# Patient Record
Sex: FEMALE | Race: WHITE | Employment: PART TIME | URBAN - METROPOLITAN AREA
[De-identification: names, ages, dates, MRNs, and addresses within clinical notes are randomized per-mention and may not be internally consistent; named-entity substitution may affect disease eponyms.]

---

## 2019-09-14 ENCOUNTER — APPOINTMENT (OUTPATIENT)
Dept: CT IMAGING | Facility: HOSPITAL | Age: 69
End: 2019-09-14
Attending: INTERNAL MEDICINE
Payer: MEDICARE

## 2019-09-14 ENCOUNTER — APPOINTMENT (OUTPATIENT)
Dept: CT IMAGING | Facility: HOSPITAL | Age: 69
End: 2019-09-14
Attending: EMERGENCY MEDICINE
Payer: MEDICARE

## 2019-09-14 ENCOUNTER — APPOINTMENT (OUTPATIENT)
Dept: GENERAL RADIOLOGY | Facility: HOSPITAL | Age: 69
End: 2019-09-14
Attending: EMERGENCY MEDICINE
Payer: MEDICARE

## 2019-09-14 ENCOUNTER — HOSPITAL ENCOUNTER (OUTPATIENT)
Facility: HOSPITAL | Age: 69
Setting detail: OBSERVATION
Discharge: HOME | End: 2019-09-15
Attending: EMERGENCY MEDICINE | Admitting: HOSPITALIST
Payer: MEDICARE

## 2019-09-14 DIAGNOSIS — E87.6 HYPOKALEMIA: ICD-10-CM

## 2019-09-14 DIAGNOSIS — R55 SYNCOPE AND COLLAPSE: Primary | ICD-10-CM

## 2019-09-14 PROBLEM — R73.9 HYPERGLYCEMIA: Status: ACTIVE | Noted: 2019-09-14

## 2019-09-14 PROCEDURE — 71275 CT ANGIOGRAPHY CHEST: CPT | Performed by: INTERNAL MEDICINE

## 2019-09-14 PROCEDURE — 99220 INITIAL OBSERVATION CARE,LEVL III: CPT | Performed by: INTERNAL MEDICINE

## 2019-09-14 PROCEDURE — 70450 CT HEAD/BRAIN W/O DYE: CPT | Performed by: EMERGENCY MEDICINE

## 2019-09-14 PROCEDURE — 71045 X-RAY EXAM CHEST 1 VIEW: CPT | Performed by: EMERGENCY MEDICINE

## 2019-09-14 RX ORDER — ONDANSETRON 2 MG/ML
4 INJECTION INTRAMUSCULAR; INTRAVENOUS EVERY 4 HOURS PRN
Status: DISCONTINUED | OUTPATIENT
Start: 2019-09-14 | End: 2019-09-15

## 2019-09-14 RX ORDER — CHOLECALCIFEROL (VITAMIN D3) 50 MCG
CAPSULE ORAL
COMMUNITY

## 2019-09-14 RX ORDER — ACETAMINOPHEN 325 MG/1
650 TABLET ORAL EVERY 6 HOURS PRN
Status: DISCONTINUED | OUTPATIENT
Start: 2019-09-14 | End: 2019-09-15

## 2019-09-14 RX ORDER — POTASSIUM CHLORIDE 20 MEQ/1
40 TABLET, EXTENDED RELEASE ORAL ONCE
Status: COMPLETED | OUTPATIENT
Start: 2019-09-14 | End: 2019-09-14

## 2019-09-14 RX ORDER — METOCLOPRAMIDE HYDROCHLORIDE 5 MG/ML
INJECTION INTRAMUSCULAR; INTRAVENOUS
Status: DISPENSED
Start: 2019-09-14 | End: 2019-09-15

## 2019-09-14 RX ORDER — ENOXAPARIN SODIUM 100 MG/ML
40 INJECTION SUBCUTANEOUS DAILY
Status: DISCONTINUED | OUTPATIENT
Start: 2019-09-14 | End: 2019-09-15

## 2019-09-14 RX ORDER — SODIUM CHLORIDE 9 MG/ML
INJECTION, SOLUTION INTRAVENOUS CONTINUOUS
Status: ACTIVE | OUTPATIENT
Start: 2019-09-14 | End: 2019-09-14

## 2019-09-14 RX ORDER — METOCLOPRAMIDE HYDROCHLORIDE 5 MG/ML
10 INJECTION INTRAMUSCULAR; INTRAVENOUS ONCE
Status: COMPLETED | OUTPATIENT
Start: 2019-09-14 | End: 2019-09-14

## 2019-09-14 RX ORDER — DIPHENHYDRAMINE HYDROCHLORIDE 50 MG/ML
INJECTION INTRAMUSCULAR; INTRAVENOUS
Status: DISPENSED
Start: 2019-09-14 | End: 2019-09-15

## 2019-09-14 RX ORDER — DIPHENHYDRAMINE HYDROCHLORIDE 50 MG/ML
25 INJECTION INTRAMUSCULAR; INTRAVENOUS ONCE
Status: COMPLETED | OUTPATIENT
Start: 2019-09-14 | End: 2019-09-14

## 2019-09-14 RX ORDER — SODIUM CHLORIDE 9 MG/ML
INJECTION, SOLUTION INTRAVENOUS CONTINUOUS
Status: DISCONTINUED | OUTPATIENT
Start: 2019-09-14 | End: 2019-09-15

## 2019-09-14 NOTE — ED PROVIDER NOTES
Patient Seen in: [facility] Emergency Department    History   Patient presents with:  Syncope (cardiovascular, neurologic)    Stated Complaint:     HPI    This is a 78-year-old female who arrives here with complaints of a syncopal episode at [facility]. retraction. No crackles. CV: Cardiovascular is regular without murmurs or rubs. ABD: The abdomen is soft nondistended nontender. There is no rebound. There is no guarding. EXT: There is good pulses bilaterally. There is no calf tenderness.   Karson Her she was given IV fluids. I went back and reexamined her she is sleepy but easily arousable. She still has a little bit of nausea but significant better. Some of her sleepiness may be from the Benadryl that she had gotten in the [facility].   The creatinine i Dr. Radha Cm. The patient still felt somewhat nauseousness. Therefore she would want to hold off on the potassium was not significantly decreased with probably can replace her with IV potassium with her at regular IV fluids.   I discussed this case with

## 2019-09-14 NOTE — ED INITIAL ASSESSMENT (HPI)
Here per EMS after she became syncopal at [facility]. + n/v, denies chest pain.

## 2019-09-15 ENCOUNTER — APPOINTMENT (OUTPATIENT)
Dept: CV DIAGNOSTICS | Facility: HOSPITAL | Age: 69
End: 2019-09-15
Attending: INTERNAL MEDICINE
Payer: MEDICARE

## 2019-09-15 ENCOUNTER — APPOINTMENT (OUTPATIENT)
Dept: ULTRASOUND IMAGING | Facility: HOSPITAL | Age: 69
End: 2019-09-15
Attending: INTERNAL MEDICINE
Payer: MEDICARE

## 2019-09-15 VITALS
SYSTOLIC BLOOD PRESSURE: 132 MMHG | HEIGHT: 62 IN | DIASTOLIC BLOOD PRESSURE: 76 MMHG | OXYGEN SATURATION: 100 % | HEART RATE: 78 BPM | TEMPERATURE: 98 F | BODY MASS INDEX: 24.59 KG/M2 | RESPIRATION RATE: 18 BRPM | WEIGHT: 133.63 LBS

## 2019-09-15 PROCEDURE — 93306 TTE W/DOPPLER COMPLETE: CPT | Performed by: INTERNAL MEDICINE

## 2019-09-15 PROCEDURE — 99225 SUBSEQUENT OBSERVATION CARE: CPT | Performed by: HOSPITALIST

## 2019-09-15 PROCEDURE — 93970 EXTREMITY STUDY: CPT | Performed by: INTERNAL MEDICINE

## 2019-09-15 PROCEDURE — 99203 OFFICE O/P NEW LOW 30 MIN: CPT | Performed by: INTERNAL MEDICINE

## 2019-09-15 NOTE — PLAN OF CARE
Assumed care of patient around 0730. Pt a/o x 4, RA, SR on tele monitor in the 76s. Denies chest pain/discomfort or SOB. Denies dizziness while ambulating. Orthos negative this a.m. Plan for cards to see, BLE US. ECHO completed this a.m., awaiting results. administer replacement therapy as ordered  Outcome: Progressing     Problem: METABOLIC/FLUID AND ELECTROLYTES - ADULT  Goal: Electrolytes maintained within normal limits  Description  INTERVENTIONS:  - Monitor labs and rhythm and assess patient for signs a Progressing     Problem: MUSCULOSKELETAL - ADULT  Goal: Return mobility to safest level of function  Description  INTERVENTIONS:  - Assess patient stability and activity tolerance for standing, transferring and ambulating w/ or w/o assistive devices  - Ass

## 2019-09-15 NOTE — H&P
[facility]OSPITAL[facility]  History and Physical     Atul Aguilar Patient Status:  Emergency    1950 MRN [id number]   Location [address] [facility] Attending Donovan Silvestre MD   Hosp Day # 0 PCP Carolynn Walters MD     Chief Complaint: bruits. Respiratory: Clear to auscultation bilaterally. No wheezes. No rhonchi. Cardiovascular: S1, S2. Regular rate and rhythm. No murmurs, rubs or gallops. Equal pulses. Chest and Back: No tenderness or deformity.   Abdomen: Soft, nontender, nondisten

## 2019-09-15 NOTE — PROGRESS NOTES
09/15/19 0934 09/15/19 0938 09/15/19 0940   Vital Signs   Respiratory Quality Normal Normal Normal   BP (!) 153/130 131/64 133/78   BP Location Left arm Left arm Left arm   BP Method Automatic Automatic Automatic   Patient Position Lying Sitting Standin

## 2019-09-15 NOTE — PLAN OF CARE
ED admit for syncope while at [facility]. Patient states she was reading bible, dropped it, bent over to get it and lost consciousness. She denies any dizziness or palpitations prior to the event.  Patient states this has happened a \"few times\" in the past, o

## 2019-09-15 NOTE — DISCHARGE SUMMARY
[facility] [place] HOSPITALIST  DISCHARGE SUMMARY     Atul Aguilar Patient Status:  Observation    1950 MRN [id number]   [facility] 8NE-A Attending Pablito Lobo MD   Hosp Day # 0 PCP Carolynn Walters MD     Date of Admission: 2019  Date of Marnie [place] Respiratory: Clear to auscultation bilaterally. No wheezes. No rhonchi. Cardiovascular: S1, S2. Regular rate and rhythm. No murmurs, rubs or gallops. Abdomen: Soft, nontender, nondistended. Positive bowel sounds. No rebound or guarding.   Neurologic:

## 2019-09-15 NOTE — PLAN OF CARE
ED admit for syncope while at [facility]. +nausea and vomiting on admit. Patient is alert/oriented x4. PERRLA. CMS intact. O2 sats > 94% on RA. Lungs CTA. D-dimer elevated, CTA chest negative for PE. Awaiting (B) LE US. NSR on tele. VSS.  Continues on IVF 0.9@ quality of pulses, skin color and temperature  - Assess for signs of decreased coronary artery perfusion - ex.  Angina  - Evaluate fluid balance, assess for edema, trend weights  Outcome: Progressing  Goal: Absence of cardiac arrhythmias or at baseline  Max products/factors, fluids and medications as ordered and appropriate  - Administer supportive blood products/factors as ordered and appropriate  Outcome: Progressing  Goal: Free from bleeding injury  Description  (Example usage: patient with low platelets)

## 2019-09-15 NOTE — PROGRESS NOTES
[facility]PITAL[facility]  Progress Note     Bre Rapp Patient Status:  Observation    1950 MRN [id number]   [facility] 8NE-A Attending Cleopatra Melo MD   Hosp Day # 0 PCP Radha Taylor MD     Chief Complaint: syncope    S: Patient denie need to consider holter   2. Hypokalemia - replace, mg and K in am   3. Mild acute kidney inury  1.  IVF     Quality:  · DVT Prophylaxis: lovenox  · CODE status: full  · Gomez: none     Plan of care discussed with pt and ER.      Tyler Xavier MD

## 2019-09-15 NOTE — PROGRESS NOTES
09/15/19 1446   Clinical Encounter Type   Visited With Patient   Routine Visit Introduction  ( initiated POA discussion.  patient states that this has been completed and will prvide hospital with a copy at earliest opportunity)   [facility] Encoun

## 2019-09-15 NOTE — CONSULTS
[facility]  Electrophysiology Initial Consult Note      Sheila Stein Patient Status:  Observation    1950 MRN [id number]   [facility] 8NE-A Attending Montserrat Crawford MD   Hosp Day # 0 PCP Tim Hooper MD     [facility] °F (36.7 °C), temperature source Oral, resp. rate 18, height 5' 2\" (1.575 m), weight 133 lb 9.6 oz (60.6 kg), SpO2 97 %.   Temp (24hrs), Av °F (36.7 °C), Min:97.5 °F (36.4 °C), Max:98.3 °F (36.8 °C)    Wt Readings from Last 3 Encounters:  14/ : 13

## 2019-09-15 NOTE — PLAN OF CARE
Problem: SAFETY ADULT - FALL  Goal: Free from fall injury  Description  INTERVENTIONS:  - Assess pt frequently for physical needs  - Identify cognitive and physical deficits and behaviors that affect risk of falls.   - [place] fall precautions as indica Progressing     Problem: METABOLIC/FLUID AND ELECTROLYTES - ADULT  Goal: Electrolytes maintained within normal limits  Description  INTERVENTIONS:  - Monitor labs and rhythm and assess patient for signs and symptoms of electrolyte imbalances  - Administer rectal medication administration  - Ensure safe mobilization of patient  - Hold pressure on venipuncture sites to achieve adequate hemostasis  - Assess for signs and symptoms of internal bleeding  - Monitor lab trends  9/15/2019 9648 by Anya Monroe RN

## 2019-09-15 NOTE — PLAN OF CARE
NURSING DISCHARGE NOTE    Discharged Home via Wheelchair. Accompanied by Family member and Support staff  Belongings Taken by patient/family. IV removed, catheter intact. D/c instructions given. No new medications. F/u appts discussed.    All

## 2019-09-15 NOTE — PROGRESS NOTES
09/14/19 2138 09/14/19 2139 09/14/19 2140   Vital Signs   /79 147/82 130/84   BP Location Left arm Left arm Left arm   BP Method Automatic Automatic Automatic   Patient Position Lying Sitting Standing

## 2023-06-05 PROBLEM — M76.32 ILIOTIBIAL BAND SYNDROME, LEFT LEG: Status: ACTIVE | Noted: 2021-06-15

## 2023-06-05 PROBLEM — M70.62 TROCHANTERIC BURSITIS OF LEFT HIP: Status: ACTIVE | Noted: 2021-06-15

## 2023-06-05 RX ORDER — GABAPENTIN 100 MG/1
100 CAPSULE ORAL NIGHTLY
COMMUNITY
Start: 2023-05-23

## 2023-06-05 RX ORDER — LORATADINE 10 MG/1
10 TABLET ORAL AS DIRECTED
COMMUNITY

## 2023-06-05 RX ORDER — TRIAMTERENE AND HYDROCHLOROTHIAZIDE 37.5; 25 MG/1; MG/1
1 CAPSULE ORAL EVERY MORNING
COMMUNITY
Start: 2022-06-11 | End: 2023-06-05

## 2023-06-05 RX ORDER — TIZANIDINE 2 MG/1
1 TABLET ORAL EVERY 6 HOURS PRN
COMMUNITY
Start: 2023-06-02

## 2023-06-05 RX ORDER — LOSARTAN POTASSIUM 25 MG/1
25 TABLET ORAL DAILY
COMMUNITY
Start: 2022-08-03 | End: 2023-06-05

## 2023-06-05 NOTE — PAT NURSING NOTE
Abnormal Ekg reviewed by /anesthesia. He requests note of medical clearance pre op. Faxed request to and spoke with Prema Springer @ pcp office. She will give message to MD. Also faxed request to surgeon as [phone] [address].

## 2023-06-06 ENCOUNTER — HOSPITAL ENCOUNTER (EMERGENCY)
Age: 73
Discharge: HOME | End: 2023-06-06
Attending: STUDENT IN AN ORGANIZED HEALTH CARE EDUCATION/TRAINING PROGRAM
Payer: MEDICARE

## 2023-06-06 VITALS
OXYGEN SATURATION: 99 % | WEIGHT: 140 LBS | TEMPERATURE: 98 F | BODY MASS INDEX: 25.76 KG/M2 | HEIGHT: 62 IN | HEART RATE: 72 BPM | RESPIRATION RATE: 18 BRPM | DIASTOLIC BLOOD PRESSURE: 84 MMHG | SYSTOLIC BLOOD PRESSURE: 144 MMHG

## 2023-06-06 DIAGNOSIS — I10 HYPERTENSION, UNSPECIFIED TYPE: Primary | ICD-10-CM

## 2023-06-06 LAB
ANION GAP SERPL CALC-SCNC: 5 MMOL/L (ref 0–18)
ATRIAL RATE: 72 BPM
BUN BLD-MCNC: 25 MG/DL (ref 7–18)
CALCIUM BLD-MCNC: 9.3 MG/DL (ref 8.5–10.1)
CHLORIDE SERPL-SCNC: 106 MMOL/L (ref 98–112)
CO2 SERPL-SCNC: 26 MMOL/L (ref 21–32)
CREAT BLD-MCNC: 1.03 MG/DL
GFR SERPLBLD BASED ON 1.73 SQ M-ARVRAT: 57 ML/MIN/1.73M2 (ref 60–?)
GLUCOSE BLD-MCNC: 88 MG/DL (ref 70–99)
OSMOLALITY SERPL CALC.SUM OF ELEC: 288 MOSM/KG (ref 275–295)
P AXIS: 68 DEGREES
P-R INTERVAL: 156 MS
POTASSIUM SERPL-SCNC: 4 MMOL/L (ref 3.5–5.1)
Q-T INTERVAL: 424 MS
QRS DURATION: 76 MS
QTC CALCULATION (BEZET): 464 MS
R AXIS: 21 DEGREES
SODIUM SERPL-SCNC: 137 MMOL/L (ref 136–145)
T AXIS: 52 DEGREES
VENTRICULAR RATE: 72 BPM

## 2023-06-06 PROCEDURE — 99284 EMERGENCY DEPT VISIT MOD MDM: CPT

## 2023-06-06 PROCEDURE — 99283 EMERGENCY DEPT VISIT LOW MDM: CPT

## 2023-06-06 PROCEDURE — 93010 ELECTROCARDIOGRAM REPORT: CPT

## 2023-06-06 PROCEDURE — 80048 BASIC METABOLIC PNL TOTAL CA: CPT | Performed by: STUDENT IN AN ORGANIZED HEALTH CARE EDUCATION/TRAINING PROGRAM

## 2023-06-06 PROCEDURE — 93005 ELECTROCARDIOGRAM TRACING: CPT

## 2023-06-06 PROCEDURE — 36415 COLL VENOUS BLD VENIPUNCTURE: CPT

## 2023-06-06 RX ORDER — HYDROCHLOROTHIAZIDE 12.5 MG/1
12.5 TABLET ORAL DAILY
Qty: 30 TABLET | Refills: 0 | Status: SHIPPED | OUTPATIENT
Start: 2023-06-06

## 2023-06-06 NOTE — ED INITIAL ASSESSMENT (HPI)
Pt to ed with c/o HTN at dr office; 190/80, normal readings at home this AM.  Denies headaches, n/v, CP or visual disturbances   No hypertensive hx

## 2023-06-07 ENCOUNTER — EKG ENCOUNTER (OUTPATIENT)
Dept: LAB | Age: 73
End: 2023-06-07
Attending: ORTHOPAEDIC SURGERY
Payer: MEDICARE

## 2023-06-07 ENCOUNTER — HOSPITAL ENCOUNTER (OUTPATIENT)
Dept: GENERAL RADIOLOGY | Age: 73
Discharge: HOME | End: 2023-06-07
Attending: ORTHOPAEDIC SURGERY
Payer: MEDICARE

## 2023-06-07 ENCOUNTER — LABORATORY ENCOUNTER (OUTPATIENT)
Dept: LAB | Age: 73
End: 2023-06-07
Attending: ORTHOPAEDIC SURGERY
Payer: MEDICARE

## 2023-06-07 DIAGNOSIS — Z01.818 PRE-OP TESTING: ICD-10-CM

## 2023-06-07 LAB
ALBUMIN SERPL-MCNC: 3.9 G/DL (ref 3.4–5)
ALP LIVER SERPL-CCNC: 75 U/L
ALT SERPL-CCNC: 18 U/L
APTT PPP: 28.2 SECONDS (ref 23.3–35.6)
AST SERPL-CCNC: 14 U/L (ref 15–37)
BASOPHILS # BLD AUTO: 0.02 X10(3) UL (ref 0–0.2)
BASOPHILS NFR BLD AUTO: 0.4 %
BILIRUB DIRECT SERPL-MCNC: 0.1 MG/DL (ref 0–0.2)
BILIRUB SERPL-MCNC: 0.4 MG/DL (ref 0.1–2)
BILIRUB UR QL STRIP.AUTO: NEGATIVE
CLARITY UR REFRACT.AUTO: CLEAR
COLOR UR AUTO: YELLOW
EOSINOPHIL # BLD AUTO: 0.06 X10(3) UL (ref 0–0.7)
EOSINOPHIL NFR BLD AUTO: 1.2 %
ERYTHROCYTE [DISTWIDTH] IN BLOOD BY AUTOMATED COUNT: 12.8 %
GLUCOSE UR STRIP.AUTO-MCNC: NEGATIVE MG/DL
HCT VFR BLD AUTO: 38.6 %
HGB BLD-MCNC: 13 G/DL
IMM GRANULOCYTES # BLD AUTO: 0.01 X10(3) UL (ref 0–1)
IMM GRANULOCYTES NFR BLD: 0.2 %
INR BLD: 0.96 (ref 0.85–1.16)
LEUKOCYTE ESTERASE UR QL STRIP.AUTO: NEGATIVE
LYMPHOCYTES # BLD AUTO: 1.03 X10(3) UL (ref 1–4)
LYMPHOCYTES NFR BLD AUTO: 20.1 %
MCH RBC QN AUTO: 30.3 PG (ref 26–34)
MCHC RBC AUTO-ENTMCNC: 33.7 G/DL (ref 31–37)
MCV RBC AUTO: 90 FL
MONOCYTES # BLD AUTO: 0.52 X10(3) UL (ref 0.1–1)
MONOCYTES NFR BLD AUTO: 10.1 %
NEUTROPHILS # BLD AUTO: 3.49 X10 (3) UL (ref 1.5–7.7)
NEUTROPHILS # BLD AUTO: 3.49 X10(3) UL (ref 1.5–7.7)
NEUTROPHILS NFR BLD AUTO: 68 %
NITRITE UR QL STRIP.AUTO: NEGATIVE
PH UR STRIP.AUTO: 6 [PH] (ref 5–8)
PLATELET # BLD AUTO: 257 10(3)UL (ref 150–450)
PROT SERPL-MCNC: 7.7 G/DL (ref 6.4–8.2)
PROT UR STRIP.AUTO-MCNC: NEGATIVE MG/DL
PROTHROMBIN TIME: 12.8 SECONDS (ref 11.6–14.8)
RBC # BLD AUTO: 4.29 X10(6)UL
RBC UR QL AUTO: NEGATIVE
SP GR UR STRIP.AUTO: 1.01 (ref 1–1.03)
UROBILINOGEN UR STRIP.AUTO-MCNC: <2 MG/DL
WBC # BLD AUTO: 5.1 X10(3) UL (ref 4–11)

## 2023-06-07 PROCEDURE — 81001 URINALYSIS AUTO W/SCOPE: CPT

## 2023-06-07 PROCEDURE — 87081 CULTURE SCREEN ONLY: CPT

## 2023-06-07 PROCEDURE — 80076 HEPATIC FUNCTION PANEL: CPT

## 2023-06-07 PROCEDURE — 85610 PROTHROMBIN TIME: CPT

## 2023-06-07 PROCEDURE — 36415 COLL VENOUS BLD VENIPUNCTURE: CPT

## 2023-06-07 PROCEDURE — 85025 COMPLETE CBC W/AUTO DIFF WBC: CPT

## 2023-06-07 PROCEDURE — 71046 X-RAY EXAM CHEST 2 VIEWS: CPT | Performed by: ORTHOPAEDIC SURGERY

## 2023-06-07 PROCEDURE — 85730 THROMBOPLASTIN TIME PARTIAL: CPT

## 2023-06-14 ENCOUNTER — APPOINTMENT (OUTPATIENT)
Dept: GENERAL RADIOLOGY | Facility: HOSPITAL | Age: 73
End: 2023-06-14
Attending: ORTHOPAEDIC SURGERY
Payer: MEDICARE

## 2023-06-14 ENCOUNTER — HOSPITAL ENCOUNTER (INPATIENT)
Facility: HOSPITAL | Age: 73
LOS: 2 days | Discharge: HOME HEALTH | End: 2023-06-16
Attending: ORTHOPAEDIC SURGERY | Admitting: ORTHOPAEDIC SURGERY
Payer: MEDICARE

## 2023-06-14 ENCOUNTER — ANESTHESIA EVENT (OUTPATIENT)
Dept: SURGERY | Facility: HOSPITAL | Age: 73
End: 2023-06-14
Payer: MEDICARE

## 2023-06-14 ENCOUNTER — ANESTHESIA (OUTPATIENT)
Dept: SURGERY | Facility: HOSPITAL | Age: 73
End: 2023-06-14
Payer: MEDICARE

## 2023-06-14 DIAGNOSIS — Z01.818 PRE-OP TESTING: Primary | ICD-10-CM

## 2023-06-14 DIAGNOSIS — E87.6 HYPOKALEMIA: ICD-10-CM

## 2023-06-14 DIAGNOSIS — R55 SYNCOPE AND COLLAPSE: ICD-10-CM

## 2023-06-14 DIAGNOSIS — N17.9 AKI (ACUTE KIDNEY INJURY) (HCC): ICD-10-CM

## 2023-06-14 DIAGNOSIS — R73.9 HYPERGLYCEMIA: ICD-10-CM

## 2023-06-14 DIAGNOSIS — M70.62 TROCHANTERIC BURSITIS OF LEFT HIP: ICD-10-CM

## 2023-06-14 DIAGNOSIS — M76.32 ILIOTIBIAL BAND SYNDROME, LEFT LEG: ICD-10-CM

## 2023-06-14 PROCEDURE — 0SG1071 FUSION OF 2 OR MORE LUMBAR VERTEBRAL JOINTS WITH AUTOLOGOUS TISSUE SUBSTITUTE, POSTERIOR APPROACH, POSTERIOR COLUMN, OPEN APPROACH: ICD-10-PCS | Performed by: ORTHOPAEDIC SURGERY

## 2023-06-14 PROCEDURE — 4A11X4G MONITORING OF PERIPHERAL NERVOUS ELECTRICAL ACTIVITY, INTRAOPERATIVE, EXTERNAL APPROACH: ICD-10-PCS | Performed by: ORTHOPAEDIC SURGERY

## 2023-06-14 PROCEDURE — 76000 FLUOROSCOPY <1 HR PHYS/QHP: CPT | Performed by: ORTHOPAEDIC SURGERY

## 2023-06-14 PROCEDURE — 0SG10AJ FUSION OF 2 OR MORE LUMBAR VERTEBRAL JOINTS WITH INTERBODY FUSION DEVICE, POSTERIOR APPROACH, ANTERIOR COLUMN, OPEN APPROACH: ICD-10-PCS | Performed by: ORTHOPAEDIC SURGERY

## 2023-06-14 PROCEDURE — 0QB00ZZ EXCISION OF LUMBAR VERTEBRA, OPEN APPROACH: ICD-10-PCS | Performed by: ORTHOPAEDIC SURGERY

## 2023-06-14 PROCEDURE — 0ST20ZZ RESECTION OF LUMBAR VERTEBRAL DISC, OPEN APPROACH: ICD-10-PCS | Performed by: ORTHOPAEDIC SURGERY

## 2023-06-14 DEVICE — OSTEOCEL PRO LARGE BULK BUY: Type: IMPLANTABLE DEVICE | Site: BACK | Status: FUNCTIONAL

## 2023-06-14 DEVICE — RELINE LCK SCRW 5.5 OPEN TULIP: Type: IMPLANTABLE DEVICE | Site: BACK | Status: FUNCTIONAL

## 2023-06-14 DEVICE — RELINE MAS MOD SCREW 6.5X45 2C: Type: IMPLANTABLE DEVICE | Site: BACK | Status: FUNCTIONAL

## 2023-06-14 DEVICE — BONE GRAFT KIT 7510200 INFUSE SMALL
Type: IMPLANTABLE DEVICE | Site: BACK | Status: FUNCTIONAL
Brand: INFUSE® BONE GRAFT

## 2023-06-14 DEVICE — RELINE MAS TI ROD 5.5X60 LRDTC: Type: IMPLANTABLE DEVICE | Site: BACK | Status: FUNCTIONAL

## 2023-06-14 DEVICE — ATTRAX® SCAFFOLD STRIPS, SMALL
Type: IMPLANTABLE DEVICE | Site: BACK | Status: FUNCTIONAL
Brand: ATTRAX

## 2023-06-14 DEVICE — COHERE TLIF-O, 12X10X30MM 12°
Type: IMPLANTABLE DEVICE | Site: BACK | Status: FUNCTIONAL
Brand: COHERE

## 2023-06-14 DEVICE — RELINE MAS RED SCREW 6.5X45 2C: Type: IMPLANTABLE DEVICE | Site: BACK | Status: FUNCTIONAL

## 2023-06-14 DEVICE — COHERE TLIF-O, 10X10X30MM 4°
Type: IMPLANTABLE DEVICE | Site: BACK | Status: FUNCTIONAL
Brand: COHERE

## 2023-06-14 DEVICE — RELINE MAS MOD REDUCTION EXT: Type: IMPLANTABLE DEVICE | Site: BACK | Status: FUNCTIONAL

## 2023-06-14 RX ORDER — CELECOXIB 200 MG/1
200 CAPSULE ORAL ONCE
Status: COMPLETED | OUTPATIENT
Start: 2023-06-14 | End: 2023-06-14

## 2023-06-14 RX ORDER — DEXAMETHASONE SODIUM PHOSPHATE 4 MG/ML
VIAL (ML) INJECTION AS NEEDED
Status: DISCONTINUED | OUTPATIENT
Start: 2023-06-14 | End: 2023-06-14 | Stop reason: SURG

## 2023-06-14 RX ORDER — LIDOCAINE HYDROCHLORIDE ANHYDROUS AND DEXTROSE MONOHYDRATE .8; 5 G/100ML; G/100ML
INJECTION, SOLUTION INTRAVENOUS CONTINUOUS PRN
Status: DISCONTINUED | OUTPATIENT
Start: 2023-06-14 | End: 2023-06-14 | Stop reason: SURG

## 2023-06-14 RX ORDER — DIPHENHYDRAMINE HYDROCHLORIDE 50 MG/ML
25 INJECTION INTRAMUSCULAR; INTRAVENOUS EVERY 4 HOURS PRN
Status: DISCONTINUED | OUTPATIENT
Start: 2023-06-14 | End: 2023-06-16

## 2023-06-14 RX ORDER — EPHEDRINE SULFATE 50 MG/ML
INJECTION INTRAVENOUS AS NEEDED
Status: DISCONTINUED | OUTPATIENT
Start: 2023-06-14 | End: 2023-06-14 | Stop reason: SURG

## 2023-06-14 RX ORDER — TRANEXAMIC ACID 10 MG/ML
INJECTION, SOLUTION INTRAVENOUS AS NEEDED
Status: DISCONTINUED | OUTPATIENT
Start: 2023-06-14 | End: 2023-06-14 | Stop reason: SURG

## 2023-06-14 RX ORDER — HYDROMORPHONE HYDROCHLORIDE 1 MG/ML
0.2 INJECTION, SOLUTION INTRAMUSCULAR; INTRAVENOUS; SUBCUTANEOUS EVERY 2 HOUR PRN
Status: DISCONTINUED | OUTPATIENT
Start: 2023-06-14 | End: 2023-06-16

## 2023-06-14 RX ORDER — ONDANSETRON 2 MG/ML
INJECTION INTRAMUSCULAR; INTRAVENOUS AS NEEDED
Status: DISCONTINUED | OUTPATIENT
Start: 2023-06-14 | End: 2023-06-14 | Stop reason: SURG

## 2023-06-14 RX ORDER — PHENYLEPHRINE HCL 10 MG/ML
VIAL (ML) INJECTION AS NEEDED
Status: DISCONTINUED | OUTPATIENT
Start: 2023-06-14 | End: 2023-06-14 | Stop reason: SURG

## 2023-06-14 RX ORDER — SODIUM CHLORIDE, SODIUM LACTATE, POTASSIUM CHLORIDE, CALCIUM CHLORIDE 600; 310; 30; 20 MG/100ML; MG/100ML; MG/100ML; MG/100ML
INJECTION, SOLUTION INTRAVENOUS CONTINUOUS
Status: DISCONTINUED | OUTPATIENT
Start: 2023-06-14 | End: 2023-06-14 | Stop reason: HOSPADM

## 2023-06-14 RX ORDER — OXYCODONE HYDROCHLORIDE 5 MG/1
5 TABLET ORAL ONCE AS NEEDED
Status: DISCONTINUED | OUTPATIENT
Start: 2023-06-14 | End: 2023-06-14 | Stop reason: HOSPADM

## 2023-06-14 RX ORDER — KETAMINE HYDROCHLORIDE 50 MG/ML
INJECTION, SOLUTION, CONCENTRATE INTRAMUSCULAR; INTRAVENOUS AS NEEDED
Status: DISCONTINUED | OUTPATIENT
Start: 2023-06-14 | End: 2023-06-14 | Stop reason: SURG

## 2023-06-14 RX ORDER — ACETAMINOPHEN 10 MG/ML
INJECTION, SOLUTION INTRAVENOUS AS NEEDED
Status: DISCONTINUED | OUTPATIENT
Start: 2023-06-14 | End: 2023-06-14 | Stop reason: SURG

## 2023-06-14 RX ORDER — ENEMA 19; 7 G/133ML; G/133ML
1 ENEMA RECTAL ONCE AS NEEDED
Status: DISCONTINUED | OUTPATIENT
Start: 2023-06-14 | End: 2023-06-16

## 2023-06-14 RX ORDER — ONDANSETRON 2 MG/ML
INJECTION INTRAMUSCULAR; INTRAVENOUS
Status: COMPLETED
Start: 2023-06-14 | End: 2023-06-14

## 2023-06-14 RX ORDER — HYDROMORPHONE HYDROCHLORIDE 1 MG/ML
0.4 INJECTION, SOLUTION INTRAMUSCULAR; INTRAVENOUS; SUBCUTANEOUS EVERY 2 HOUR PRN
Status: DISCONTINUED | OUTPATIENT
Start: 2023-06-14 | End: 2023-06-16

## 2023-06-14 RX ORDER — DIAZEPAM 2 MG/1
2 TABLET ORAL EVERY 6 HOURS PRN
Status: DISCONTINUED | OUTPATIENT
Start: 2023-06-14 | End: 2023-06-16

## 2023-06-14 RX ORDER — SODIUM CHLORIDE, SODIUM LACTATE, POTASSIUM CHLORIDE, CALCIUM CHLORIDE 600; 310; 30; 20 MG/100ML; MG/100ML; MG/100ML; MG/100ML
INJECTION, SOLUTION INTRAVENOUS CONTINUOUS
Status: DISCONTINUED | OUTPATIENT
Start: 2023-06-14 | End: 2023-06-14

## 2023-06-14 RX ORDER — CEFAZOLIN SODIUM/WATER 2 G/20 ML
2 SYRINGE (ML) INTRAVENOUS EVERY 8 HOURS
Status: COMPLETED | OUTPATIENT
Start: 2023-06-14 | End: 2023-06-15

## 2023-06-14 RX ORDER — DOCUSATE SODIUM 100 MG/1
100 CAPSULE, LIQUID FILLED ORAL 2 TIMES DAILY
Status: DISCONTINUED | OUTPATIENT
Start: 2023-06-14 | End: 2023-06-16

## 2023-06-14 RX ORDER — MIDAZOLAM HYDROCHLORIDE 1 MG/ML
1 INJECTION INTRAMUSCULAR; INTRAVENOUS EVERY 5 MIN PRN
Status: DISCONTINUED | OUTPATIENT
Start: 2023-06-14 | End: 2023-06-14 | Stop reason: HOSPADM

## 2023-06-14 RX ORDER — ONDANSETRON 2 MG/ML
4 INJECTION INTRAMUSCULAR; INTRAVENOUS ONCE
Status: COMPLETED | OUTPATIENT
Start: 2023-06-14 | End: 2023-06-14

## 2023-06-14 RX ORDER — OXYCODONE HYDROCHLORIDE 5 MG/1
10 TABLET ORAL ONCE AS NEEDED
Status: DISCONTINUED | OUTPATIENT
Start: 2023-06-14 | End: 2023-06-14 | Stop reason: HOSPADM

## 2023-06-14 RX ORDER — LIDOCAINE HYDROCHLORIDE 10 MG/ML
INJECTION, SOLUTION EPIDURAL; INFILTRATION; INTRACAUDAL; PERINEURAL AS NEEDED
Status: DISCONTINUED | OUTPATIENT
Start: 2023-06-14 | End: 2023-06-14 | Stop reason: SURG

## 2023-06-14 RX ORDER — DIPHENHYDRAMINE HCL 25 MG
25 CAPSULE ORAL EVERY 4 HOURS PRN
Status: DISCONTINUED | OUTPATIENT
Start: 2023-06-14 | End: 2023-06-16

## 2023-06-14 RX ORDER — CEFAZOLIN SODIUM/WATER 2 G/20 ML
2 SYRINGE (ML) INTRAVENOUS ONCE
Status: COMPLETED | OUTPATIENT
Start: 2023-06-14 | End: 2023-06-14

## 2023-06-14 RX ORDER — ACETAMINOPHEN 500 MG
1000 TABLET ORAL ONCE
Status: DISCONTINUED | OUTPATIENT
Start: 2023-06-14 | End: 2023-06-14 | Stop reason: HOSPADM

## 2023-06-14 RX ORDER — HYDROMORPHONE HYDROCHLORIDE 1 MG/ML
0.4 INJECTION, SOLUTION INTRAMUSCULAR; INTRAVENOUS; SUBCUTANEOUS EVERY 5 MIN PRN
Status: DISCONTINUED | OUTPATIENT
Start: 2023-06-14 | End: 2023-06-14 | Stop reason: HOSPADM

## 2023-06-14 RX ORDER — ONDANSETRON 2 MG/ML
4 INJECTION INTRAMUSCULAR; INTRAVENOUS EVERY 6 HOURS PRN
Status: DISCONTINUED | OUTPATIENT
Start: 2023-06-14 | End: 2023-06-14 | Stop reason: HOSPADM

## 2023-06-14 RX ORDER — OXYCODONE HYDROCHLORIDE 5 MG/1
2.5 TABLET ORAL EVERY 4 HOURS PRN
Status: DISCONTINUED | OUTPATIENT
Start: 2023-06-14 | End: 2023-06-15

## 2023-06-14 RX ORDER — METHOCARBAMOL 100 MG/ML
INJECTION, SOLUTION INTRAMUSCULAR; INTRAVENOUS AS NEEDED
Status: DISCONTINUED | OUTPATIENT
Start: 2023-06-14 | End: 2023-06-14 | Stop reason: SURG

## 2023-06-14 RX ORDER — OXYCODONE HYDROCHLORIDE 5 MG/1
5 TABLET ORAL EVERY 4 HOURS PRN
Status: DISCONTINUED | OUTPATIENT
Start: 2023-06-14 | End: 2023-06-15

## 2023-06-14 RX ORDER — ONDANSETRON 2 MG/ML
4 INJECTION INTRAMUSCULAR; INTRAVENOUS EVERY 6 HOURS PRN
Status: DISCONTINUED | OUTPATIENT
Start: 2023-06-14 | End: 2023-06-16

## 2023-06-14 RX ORDER — NALOXONE HYDROCHLORIDE 0.4 MG/ML
80 INJECTION, SOLUTION INTRAMUSCULAR; INTRAVENOUS; SUBCUTANEOUS AS NEEDED
Status: DISCONTINUED | OUTPATIENT
Start: 2023-06-14 | End: 2023-06-14 | Stop reason: HOSPADM

## 2023-06-14 RX ORDER — POLYETHYLENE GLYCOL 3350 17 G/17G
17 POWDER, FOR SOLUTION ORAL DAILY PRN
Status: DISCONTINUED | OUTPATIENT
Start: 2023-06-14 | End: 2023-06-16

## 2023-06-14 RX ORDER — MEPERIDINE HYDROCHLORIDE 25 MG/ML
12.5 INJECTION INTRAMUSCULAR; INTRAVENOUS; SUBCUTANEOUS AS NEEDED
Status: DISCONTINUED | OUTPATIENT
Start: 2023-06-14 | End: 2023-06-14 | Stop reason: HOSPADM

## 2023-06-14 RX ORDER — BISACODYL 10 MG
10 SUPPOSITORY, RECTAL RECTAL
Status: DISCONTINUED | OUTPATIENT
Start: 2023-06-14 | End: 2023-06-16

## 2023-06-14 RX ORDER — SENNOSIDES 8.6 MG
17.2 TABLET ORAL NIGHTLY
Status: DISCONTINUED | OUTPATIENT
Start: 2023-06-14 | End: 2023-06-16

## 2023-06-14 RX ORDER — METOCLOPRAMIDE HYDROCHLORIDE 5 MG/ML
10 INJECTION INTRAMUSCULAR; INTRAVENOUS EVERY 8 HOURS PRN
Status: DISCONTINUED | OUTPATIENT
Start: 2023-06-14 | End: 2023-06-14 | Stop reason: HOSPADM

## 2023-06-14 RX ORDER — METOCLOPRAMIDE HYDROCHLORIDE 5 MG/ML
10 INJECTION INTRAMUSCULAR; INTRAVENOUS EVERY 8 HOURS PRN
Status: DISCONTINUED | OUTPATIENT
Start: 2023-06-14 | End: 2023-06-16

## 2023-06-14 RX ORDER — HYDROMORPHONE HYDROCHLORIDE 1 MG/ML
0.2 INJECTION, SOLUTION INTRAMUSCULAR; INTRAVENOUS; SUBCUTANEOUS EVERY 5 MIN PRN
Status: DISCONTINUED | OUTPATIENT
Start: 2023-06-14 | End: 2023-06-14 | Stop reason: HOSPADM

## 2023-06-14 RX ADMIN — SODIUM CHLORIDE, SODIUM LACTATE, POTASSIUM CHLORIDE, CALCIUM CHLORIDE: 600; 310; 30; 20 INJECTION, SOLUTION INTRAVENOUS at 10:38:00

## 2023-06-14 RX ADMIN — ACETAMINOPHEN 1000 MG: 10 INJECTION, SOLUTION INTRAVENOUS at 12:59:00

## 2023-06-14 RX ADMIN — SODIUM CHLORIDE, SODIUM LACTATE, POTASSIUM CHLORIDE, CALCIUM CHLORIDE: 600; 310; 30; 20 INJECTION, SOLUTION INTRAVENOUS at 13:26:00

## 2023-06-14 RX ADMIN — DEXAMETHASONE SODIUM PHOSPHATE 8 MG: 4 MG/ML VIAL (ML) INJECTION at 11:10:00

## 2023-06-14 RX ADMIN — LIDOCAINE HYDROCHLORIDE ANHYDROUS AND DEXTROSE MONOHYDRATE 2 MG/KG/HR: .8; 5 INJECTION, SOLUTION INTRAVENOUS at 11:00:00

## 2023-06-14 RX ADMIN — EPHEDRINE SULFATE 10 MG: 50 INJECTION INTRAVENOUS at 11:31:00

## 2023-06-14 RX ADMIN — EPHEDRINE SULFATE 5 MG: 50 INJECTION INTRAVENOUS at 11:14:00

## 2023-06-14 RX ADMIN — EPHEDRINE SULFATE 15 MG: 50 INJECTION INTRAVENOUS at 10:53:00

## 2023-06-14 RX ADMIN — LIDOCAINE HYDROCHLORIDE 50 MG: 10 INJECTION, SOLUTION EPIDURAL; INFILTRATION; INTRACAUDAL; PERINEURAL at 10:41:00

## 2023-06-14 RX ADMIN — KETAMINE HYDROCHLORIDE 15 MG: 50 INJECTION, SOLUTION, CONCENTRATE INTRAMUSCULAR; INTRAVENOUS at 11:00:00

## 2023-06-14 RX ADMIN — PHENYLEPHRINE HCL 100 MCG: 10 MG/ML VIAL (ML) INJECTION at 12:25:00

## 2023-06-14 RX ADMIN — ONDANSETRON 4 MG: 2 INJECTION INTRAMUSCULAR; INTRAVENOUS at 12:59:00

## 2023-06-14 RX ADMIN — CEFAZOLIN SODIUM/WATER 2 G: 2 G/20 ML SYRINGE (ML) INTRAVENOUS at 11:06:00

## 2023-06-14 RX ADMIN — METHOCARBAMOL 750 MG: 100 INJECTION, SOLUTION INTRAMUSCULAR; INTRAVENOUS at 12:59:00

## 2023-06-14 RX ADMIN — PHENYLEPHRINE HCL 100 MCG: 10 MG/ML VIAL (ML) INJECTION at 11:54:00

## 2023-06-14 RX ADMIN — TRANEXAMIC ACID 1000 MG: 10 INJECTION, SOLUTION INTRAVENOUS at 10:46:00

## 2023-06-14 NOTE — ANESTHESIA PROCEDURE NOTES
Airway  Date/Time: 6/14/2023 10:43 AM  Urgency: elective      General Information and Staff    Patient location during procedure: OR  Anesthesiologist: Johanne Collins DO  Resident/CRNA: Elzbieta Perez CRNA  Performed: CRNA   Performed by: Elzbieta Perez CRNA  Authorized by: Johanne Collins DO      Indications and Patient Condition  Indications for airway management: anesthesia  Sedation level: deep  Preoxygenated: yes  Patient position: sniffing  Mask difficulty assessment: 0 - not attempted    Final Airway Details  Final airway type: endotracheal airway      Successful airway: ETT  Cuffed: yes   Successful intubation technique: direct laryngoscopy  Facilitating devices/methods: rapid sequence intubation  Endotracheal tube insertion site: oral  Blade: Emily  Blade size: #4  ETT size (mm): 7.0    Cormack-Lehane Classification: grade I - full view of glottis  Placement verified by: capnometry   Measured from: lips  ETT to lips (cm): 21  Number of attempts at approach: 1

## 2023-06-14 NOTE — ANESTHESIA POSTPROCEDURE EVALUATION
[facility] Patient Status:  Inpatient   Age/Gender 74year old female MRN [id number]   [facility] SURGERY Attending Elizabeth Barriga MD   Hosp Day # 0 PCP Jazmní Mcmahan MD       Anesthesia Post-op Note    LUMBAR 3 - LUMBAR 4, LUMBAR 4 - LUMBAR 5 TRANSFORAMINAL LUMBAR INTERBODY FUSION WITH HARDWARE    Procedure Summary     Date: 06/14/23 Room / Location: [facility] MAIN OR 11 / [facility] MAIN OR    Anesthesia Start: 7733 Anesthesia Stop: 2033    Procedures:       LUMBAR 3 - LUMBAR 4, LUMBAR 4 - LUMBAR 5 TRANSFORAMINAL LUMBAR INTERBODY FUSION WITH HARDWARE (Spine Lumbar)      INTRAOPERATIVE NEURO MONITORING (Spine Lumbar) Diagnosis: (L3-L5 STENOSIS; SPONDYLOLISTHESIS)    Surgeons: Elizabeth Barriga MD Anesthesiologist: Christina Irvin DO    Anesthesia Type: general ASA Status: 3          Anesthesia Type: general    Vitals Value Taken Time   /75 06/14/23 1340   Temp 98.5 06/14/23 1340   Pulse 95 06/14/23 1340   Resp 18 06/14/23 1340   SpO2 98% 06/14/23 1340       Patient Location: PACU    Anesthesia Type: general    Airway Patency: patent    Postop Pain Control: adequate    Mental Status: mildly sedated but able to meaningfully participate in the post-anesthesia evaluation    Nausea/Vomiting: none    Cardiopulmonary/Hydration status: stable euvolemic    Complications: no apparent anesthesia related complications    Postop vital signs: stable    Dental Exam: Unchanged from Preop    Patient to be discharged from PACU when criteria met.

## 2023-06-14 NOTE — PLAN OF CARE
Received pt from pacu at 1700. Awake and alert. Moves all extremities. Denies n/t. Dressing dry and intact to back. Gomez intact and patent. Receiving Zofran and Reglan for c/o nausea with relief. Denies need for pain med. Family at bedside. Pt and family verbalized understanding of POC. Possible dc to home tomorrow.

## 2023-06-14 NOTE — BRIEF OP NOTE
Pre-Operative Diagnosis: L3-L5 STENOSIS; SPONDYLOLISTHESIS     Post-Operative Diagnosis: L3-L5 STENOSIS; SPONDYLOLISTHESIS      Procedure Performed:   LUMBAR 3 - LUMBAR 4, LUMBAR 4 - LUMBAR 5 TRANSFORAMINAL LUMBAR INTERBODY FUSION WITH HARDWARE    Surgeon(s) and Role:     * Kathia Anderson MD - Primary    Assistant(s):  PA: Neena Barthel, PA     Surgical Findings: above     Specimen: none     Estimated Blood Loss: No data recorded    Dictation Number:      GIACOMO Meraz  6/14/2023  1:37 PM

## 2023-06-15 LAB
HCT VFR BLD AUTO: 30.2 %
HGB BLD-MCNC: 10.3 G/DL

## 2023-06-15 PROCEDURE — 85014 HEMATOCRIT: CPT | Performed by: PHYSICIAN ASSISTANT

## 2023-06-15 PROCEDURE — 97530 THERAPEUTIC ACTIVITIES: CPT

## 2023-06-15 PROCEDURE — 97161 PT EVAL LOW COMPLEX 20 MIN: CPT

## 2023-06-15 PROCEDURE — 85018 HEMOGLOBIN: CPT | Performed by: PHYSICIAN ASSISTANT

## 2023-06-15 RX ORDER — PROCHLORPERAZINE EDISYLATE 5 MG/ML
5 INJECTION INTRAMUSCULAR; INTRAVENOUS EVERY 6 HOURS PRN
Status: DISCONTINUED | OUTPATIENT
Start: 2023-06-15 | End: 2023-06-16

## 2023-06-15 RX ORDER — ACETAMINOPHEN 325 MG/1
650 TABLET ORAL EVERY 6 HOURS PRN
Status: DISCONTINUED | OUTPATIENT
Start: 2023-06-15 | End: 2023-06-16

## 2023-06-15 RX ORDER — HYDROCODONE BITARTRATE AND ACETAMINOPHEN 10; 325 MG/1; MG/1
2 TABLET ORAL EVERY 6 HOURS PRN
Status: DISCONTINUED | OUTPATIENT
Start: 2023-06-15 | End: 2023-06-16

## 2023-06-15 RX ORDER — TRAMADOL HYDROCHLORIDE 50 MG/1
50 TABLET ORAL EVERY 4 HOURS PRN
Status: DISCONTINUED | OUTPATIENT
Start: 2023-06-15 | End: 2023-06-16

## 2023-06-15 RX ORDER — HYDROCODONE BITARTRATE AND ACETAMINOPHEN 10; 325 MG/1; MG/1
1 TABLET ORAL EVERY 4 HOURS PRN
Status: DISCONTINUED | OUTPATIENT
Start: 2023-06-15 | End: 2023-06-16

## 2023-06-15 NOTE — CM/SW NOTE
Department  [facility]. Assigned CM/SW to follow up with pt/family on further discharge planning.      Shahbaz CHEUNG [facility]

## 2023-06-15 NOTE — PLAN OF CARE
Pt AOx4. VSS on RA. SCD's on. /IS. Voiding via mario catheter. See MAR for pain and antiemetic medication. Regular diet, unable to tolerate food due to nausea at this time. Dressing to lower medial back dry and intact. Plan is for PT/OT to see tomorrow. Plan of care updated with pt. Will continue to monitor.

## 2023-06-15 NOTE — CM/SW NOTE
06/15/23 1400   CM/SW Referral Data   Referral Source Social Work (self-referral)   Reason for Referral Discharge planning   Informant EMR;Clinical Staff Member;Patient;Daughter; Son   Patient Info   Patient's Current Mental Status at Time of Assessment Alert;Oriented   Discharge Needs   Anticipated D/C needs Home health care       HOME SITUATION  Type of Home: House ([place] 55+ active adult community)   Home Layout: One level  Stairs to Enter : 1  Railing: Yes  Stairs to [employer]: 0     Lives With: Alone (dtr and grandaughter able to help)  Drives: Yes  Patient Owned Equipment: Rolling walker;Cane;Wheelchair  Patient Regularly Uses: Reading glasses     Prior Level of [place] per PT eval: PTA pt was independent with all ADLs, did not use an assistive device. She lives in a 55+ community but has her own home where she lives alone and does not require any assistance. Her daughter (who is a nurse) and granddaughter will be there to help after returning home. Patient is a 73 y/o woman admitted s/p lumbar fusion. Pt with pre-operative plan for [facility]. PT eval pending. Referral sent to [facility] via [employer] and confirmation received that pt can be accepted. Met with pt, pt's dtr and son at bedside and provided [employer] information for [facility]. Pt agreeable with DC plan. No further DC needs/concerns identified at this time. SW available should additional discharge needs arise.     Merlyn Waggoner [facility]  Discharge Planner  [phone]

## 2023-06-15 NOTE — PLAN OF CARE
A&Ox4. VSS. On room air. . IS encouraged. SCDs and teds. Ankle pumps encouraged. Last BM 6/13. Gomez catheter in place postop, will be removed this morning. Pain managed with PO medication. Patient reporting nausea, emesis x1, antiemetics given with relief. Dressing to back C/D/I, gel ice in place. Plan is for PT/OT eval. Patient updated and in agreement with plan of care. Safety precautions in place. Instructed patient to call for assistance, call light within reach.

## 2023-06-15 NOTE — OCCUPATIONAL THERAPY NOTE
OT order received, chart reviewed. Attempted to evaluate patient on multiple occasions on this date. Patient presented with nausea on each attempt and refused therapy. Will follow-up at a later date.

## 2023-06-16 VITALS
HEIGHT: 62 IN | WEIGHT: 142 LBS | BODY MASS INDEX: 26.13 KG/M2 | OXYGEN SATURATION: 95 % | RESPIRATION RATE: 20 BRPM | TEMPERATURE: 99 F | HEART RATE: 102 BPM | DIASTOLIC BLOOD PRESSURE: 80 MMHG | SYSTOLIC BLOOD PRESSURE: 162 MMHG

## 2023-06-16 LAB
ANION GAP SERPL CALC-SCNC: 4 MMOL/L (ref 0–18)
BUN BLD-MCNC: 17 MG/DL (ref 7–18)
CALCIUM BLD-MCNC: 8.5 MG/DL (ref 8.5–10.1)
CHLORIDE SERPL-SCNC: 108 MMOL/L (ref 98–112)
CO2 SERPL-SCNC: 28 MMOL/L (ref 21–32)
CREAT BLD-MCNC: 0.8 MG/DL
GFR SERPLBLD BASED ON 1.73 SQ M-ARVRAT: 77 ML/MIN/1.73M2 (ref 60–?)
GLUCOSE BLD-MCNC: 105 MG/DL (ref 70–99)
MAGNESIUM SERPL-MCNC: 1.9 MG/DL (ref 1.6–2.6)
OSMOLALITY SERPL CALC.SUM OF ELEC: 292 MOSM/KG (ref 275–295)
POTASSIUM SERPL-SCNC: 3.4 MMOL/L (ref 3.5–5.1)
POTASSIUM SERPL-SCNC: 3.5 MMOL/L (ref 3.5–5.1)
SODIUM SERPL-SCNC: 140 MMOL/L (ref 136–145)

## 2023-06-16 PROCEDURE — 97116 GAIT TRAINING THERAPY: CPT

## 2023-06-16 PROCEDURE — 80048 BASIC METABOLIC PNL TOTAL CA: CPT | Performed by: HOSPITALIST

## 2023-06-16 PROCEDURE — 83735 ASSAY OF MAGNESIUM: CPT | Performed by: HOSPITALIST

## 2023-06-16 PROCEDURE — 84132 ASSAY OF SERUM POTASSIUM: CPT | Performed by: INTERNAL MEDICINE

## 2023-06-16 PROCEDURE — 97165 OT EVAL LOW COMPLEX 30 MIN: CPT

## 2023-06-16 RX ORDER — HYDROCHLOROTHIAZIDE 12.5 MG/1
12.5 TABLET ORAL DAILY
Status: DISCONTINUED | OUTPATIENT
Start: 2023-06-16 | End: 2023-06-16

## 2023-06-16 RX ORDER — HYDRALAZINE HYDROCHLORIDE 20 MG/ML
10 INJECTION INTRAMUSCULAR; INTRAVENOUS EVERY 6 HOURS PRN
Status: DISCONTINUED | OUTPATIENT
Start: 2023-06-16 | End: 2023-06-16

## 2023-06-16 RX ORDER — POTASSIUM CHLORIDE 20 MEQ/1
40 TABLET, EXTENDED RELEASE ORAL EVERY 4 HOURS
Status: COMPLETED | OUTPATIENT
Start: 2023-06-16 | End: 2023-06-16

## 2023-06-16 RX ORDER — ONDANSETRON 4 MG/1
4 TABLET, ORALLY DISINTEGRATING ORAL EVERY 8 HOURS PRN
Qty: 20 TABLET | Refills: 0 | Status: SHIPPED | OUTPATIENT
Start: 2023-06-16

## 2023-06-16 NOTE — PLAN OF CARE
A&Ox4. Hypertensive this morning - MD notified, see MAR. On room air. . IS encouraged. SCDs and teds. Ankle pumps encouraged. Last BM 6/13. Voiding freely. Pain managed with PO medication. Patient reporting nausea this morning, but states it is not as bad as yesterday, declining antiemetics at this time. Dressing to back C/D/I, gel ice in place. Ambulating with standby assist with walker, chairback brace worn when OOB. Plan is to dc home when cleared. Patient updated and in agreement with plan of care. Safety precautions in place. Instructed patient to call for assistance, call light within reach.

## 2023-06-16 NOTE — PLAN OF CARE
A/o x4. RA/. IS encouraged. SCD's/Teds/ankle pumps encouraged. Regular diet n/v. Voiding without difficulty. Gel ice. LBM 6/13. Voiding without difficulty. Pain managed with po pain medication. IV SL. Denies n/t. Up with minimal assistance with chair back brace and walker. Plan to dc home when medically cleared. POC updated with pt. All safety measures in place. Call light within reach instructed pt to call for help or assistance.

## 2023-06-16 NOTE — PROGRESS NOTES
Patient seen and examined. Neuro intact    Discharge home when milena PO and cleared by PT.     Ivy Kern MD

## 2023-06-16 NOTE — PHYSICAL THERAPY NOTE
IP PT attempted, RN consulted prior to session,  pt laying in bed. Pt reports \"muscle spasms\" and requesting pain meds. Will re-attempt as schedule permits. RN made aware. Attempt x 2, pt getting up to BR c PCT and on toilet, will re-attempt as schedule permits.

## 2023-06-16 NOTE — OPERATIVE REPORT
[facility]    PATIENT'S NAME: Margret Vail   ATTENDING PHYSICIAN: Don Delvalle M.D. OPERATING PHYSICIAN: Don Delvalle M.D. PATIENT ACCOUNT#:   [de-identified]    LOCATION:  [address] [place] [address]  MEDICAL RECORD #:   [id number]       YOB: 1950  ADMISSION DATE:       06/14/2023      OPERATION DATE:  06/14/2023    OPERATIVE REPORT    PREOPERATIVE DIAGNOSIS:  L3-4, L4-5 stenosis, spondylolisthesis, kyphosis. POSTOPERATIVE DIAGNOSIS:  L3-4, L4-5 stenosis, spondylolisthesis, kyphosis. PROCEDURE PERFORMED:  L3-4 and L4-5 decompression, interbody reconstruction, and fusion. INDICATIONS:  The patient had failed reasonable conservative measures which are outlined in the patient's clinic medical record. Physical therapy, medical management, injections, alternative treatment are among those offered unless motor deficits are progressive. Indications for surgery are based on scientific literature and MOI guidelines. A thorough meeting with the patient and at least one key caregiver was had. The risks and benefits were discussed in significant detail. The risks include, but are not limited to, bleeding, infection, spinal leaks, nerve injuries, hardware failure/migration, pseudarthrosis, adjacent segment disease, and need for further surgery. I have gone over the operation using models, diagrams, and the patient's own imaging studies. Additional written and digital sources were provided. No guarantees were made. ASSISTANT:  Radha Andrea PA-C. A skilled and experienced assistant was required for the entire surgical procedure. As a lumbar spinal reconstruction, the procedure is done in immediate proximity to critical neural elements and is done in close proximity to the critical vascular and visceral structures. Much of the surgery is done in and around the cauda equine and its exiting nerves.   The complex reconstructive nature of the procedure requires application of screws, rods, and interbody devices. Any assistance, including, but not limited to, retraction, suction, and other means of facilitation of the procedure requires an individual with substantial postgraduate training and substantial spinal surgical experience. ESTIMATED BLOOD LOSS:  50 mL. SPECIMENS:  None. DESCRIPTION OF PROCEDURE:  After obtaining informed consent, the patient was taken to the operating room. SCDs and NOE hose were applied. Preoperative antibiotics were delivered. Patient was placed in the prone position. Neuromonitoring leads were put in place and baselines were achieved. An internal twitch test verified the patient had 4 out of 4 twitches. A physician was confirmed as evaluating all neuromonitoring data. All bony prominences were padded. The back was sterilely prepped and draped. AP fluoroscopy was wheeled in. We marked pedicles at all levels outlined above bilaterally. Two separate incisions 1-1/2 fingerbreadths lateral to this approximately an inch and a half long were made with a larger incision on the patient's more symptomatic side. This was done with a #10 blade. Bovie electrocautery was used for hemostasis. Dissection was taken down to the level of the fascia. Fascia was split in paraspinal fashion bilaterally. I-PAS needles were utilized with live neuromonitoring in place. Baselines had been achieved. We had confirmed 4 out of 4 twitches with our colleagues and neuromonitoring. Under fluoroscopic guidance, we targeted the lateral aspect of the pedicles bilaterally. These I-PAS needles were then impacted medially and then checked under lateral fluoroscopy for being in appropriate position. Bone marrow was aspirated by placing a needle through the skin and through the periosteum and into the pedicle. A 10 mL syringe was used to aspirate several milliliters of bone marrow aspirate. The needle was removed with a twisting motion.   All I-PAS needles were found to be well above acceptable thresholds. Styluses were removed. K-wires were applied and advanced. Inner styluses were removed as well. Both sides had dilators applied and had appropriate taps applied. We placed a modular screw with hoop shims attached over the K-wire. Both taps were applied with live neuromonitoring and found to have thresholds well above acceptable limits. We repeated the same steps at distal levels without difficulty. The retractor apparatus was placed over the hoop shims. A medial retractor was sized and applied. All tissues were retracted out of harm's way with regards to muscle in the paraspinal plane and an articulating arm was attached to the retractor. Bovie electrocautery was used to remove remaining tissue over the pars and facet. Pre-operative measurements documenting a mismatch between pelvic incidence and lumbar lordosis was made, thus documenting clinically significant kyphosis. As a result, lumbar osteotomies are required to improve the patient's overall sagittal balance. Our attention first turned to the L4-5 level. The facet was osteotomized and resected. A bur was used to remove additional bone. Pars was resected as well. We identified the lateral ligamentum flavum and resected that with a Vergara ball in place over the dura and a 4 and 5-mm Kerrison. Pars was resected as well. The area was further osteotomized from cranial to caudal pedicle using bur, Kerrison rongeurs, and other instruments. Neural elements were not harmed during this process. The osteotomy facilitated reduction of the patient's kyphosis by allowing for compression via pedicle screws at the conclusion of the case. A bur was used to remove additional bone. We identified the lateral ligamentum flavum and resected that with a Vergara ball in place over the dura and a 4 and 5-mm Kerrison. Neural elements were identified and defined for a standard posterior interbody fusion.     Significant spinal stenosis was identified consistent with the patient's radiculopathy and deficits. We then were required to perform a technically demanding decompression using fine microcurrettes, Kerrison rongeurs, microinstruments, and magnification. Despite adherence of the compressive pathology to neural elements, we were able to define tissue planes to help facilitate release of neurocompressive pathology. The procedure required additional time and technique beyond that of the interbody fusion itself. Contralateral decompression was undertaken, thus creating a bilateral decompression and bilateral microforaminotomy and hemilaminotomy using undercutting technique. With undercutting technique and a Vergara ball in place, we excised ligamentum flavum through the bone at the cranial foramina as needed, facilitating a serial cranial decompression and undercutting  microforaminotomy and laminectomy. This was done in an undercutting fashion as well and done to examine the central and contralateral neural elements facilitating central bilateral decompression as well. The traversing nerve root was identified and thoroughly decompressed. Undercutting decompression was undertaken. An extraforaminal and transfacet/transpedicular decompression was utilized to perform a thorough decompression of the exiting nerve root and remove redundant annulus and any herniated nucleus pulposus. Our attention then turned to utilization of the separate contralateral incision. With a retractor in place, we localized the far lateral region of the level. We examined areas more farther laterally to facilitate a transpedicular/transforaminal decompression. Nerve root retractor was applied. We were able to retract the traversing nerve root over. Annulotomy was performed. Farther lateral structures and paraspinal muscles were dissected away from the area of the transverse process and farther anteriorly.   Psoas muscle tissues were also dissected free in the far lateral plane. Significant lateral compressive disease was identified consistent with the patient's radiculopathy and deficits. We then were required to perform a technically demanding decompression using fine microcurrettes, Kerrison rongeurs, microinstruments, and magnification. Despite adherence of the compressive pathology to neural elements, we were able to define tissue planes to help facilitate release of neurocompressive pathology. The procedure required additional time and technique beyond that of the interbody fusion itself. Curettes, tatiana, and pituitary rongeurs were used to remove the rest of the remaining disc in order to perform lateral extraforaminal and transpedicular discectomy. Endplates were prepared. Curettes and osteotomes were used to perform bony anterior release and osteotomize any anterior column ossification. We applied some distraction across the pedicle screws. We sized for a cage. Allograft complex was impacted into the lateral disc space, and the cage was impacted as well with nerve roots safely out of harm's way. Our attention then turned to the adjacent L3-4 level where we repeated the same steps. The retractor apparatus was placed over the hoop shims. A medial retractor was sized and applied. All tissues were retracted out of harm's way with regards to muscle in the paraspinal plane, and an articulating arm was attached to the retractor. Bovie electrocautery was used to remove remaining tissue over the pars and facet. Pre-operative measurements documenting a mismatch between pelvic incidence and lumbar lordosis was made thus documenting clinically significant kyphosis. The facet was osteotomized and resected. A bur was used to remove additional bone. Pars was resected as well. We identified the lateral ligamentum flavum and resected that with a Vergara ball in place over the dura and a 4 and 5 mm Kerrison.       Significant spinal stenosis was identified consistent with the patient's radiculopathy and deficits. We then were required to perform a technically demanding decompression using fine microcurrettes, Kerrison rongeurs, microinstruments, and magnification. Despite adherence of the compressive pathology to neural elements, we were able to define tissue planes to help facilitate release of neurocompressive pathology. The procedure required additional time and technique beyond that of the interbody fusion itself. The traversing nerve root was identified and thoroughly decompressed. Contralateral decompression was undertaken, thus creating a bilateral decompression and bilateral microforaminotomy and hemilaminotomy using undercutting technique. With undercutting technique and a Vergara ball in place, we excised ligamentum flavum through the bone at the cranial foramina as needed, facilitating a serial cranial decompression and undercutting microforaminotomy and hemilaminotomy versus undercutting hemilaminectomy. This was done in an undercutting fashion and done to examine the central and contralateral neural elements facilitating central bilateral decompression as well. Because of additional disease and neurocompressive pathology in the extraforaminal space, an extraforaminal and transpedicular decompression was utilized to perform a thorough decompression of the exiting nerve root and remove redundant annulus and any herniated nucleus pulposus. Our attention then turned to a separate contralateral incision with a unique fascial incision. The retractor was applied. Farther lateral structures and paraspinal muscles were dissected away from the area of the transverse process. Far lateral structures were also dissected free, including psoas muscle tissues. Curettes, tatiana, and pituitary rongeurs were used to remove the rest of the remaining disc in order to perform lateral extraforaminal and transpedicular discectomy.   Nerve root retractor was applied. We were able to retract the traversing nerve root over. Annulotomy was performed and endplates were prepared. Curettes and osteotomes were used perform bony anterior release and osteotomize any anterior column ossification. We applied some distraction across the pedicle screws. We sized for a cage. Allograft complex was impacted into the lateral disc space, and the case was impacted as well, with nerve roots safely out of harm's way. Utilizing the incisions and fascial openings previously utilized for the osteotomy and decompression we proceeded with a posterolateral fusion at the L3-4 and L4-5 levels. Posterior elements were decorticated. Allograft and local bone graft were applied. The wounds were thoroughly irrigated. Hemostasis was obtained. Tulip heads were placed on the contralateral side. Then, with dilators in place, the screws were placed. Instrumentation was placed on the right over K-wires in standard technique and fluoroscopy. Screws were advanced under fluoroscopy. All screws were tested using real-time neuromonitoring. Thresholds were above acceptable values. Rods and top tighteners were applied bilaterally, and final tightening was applied with compression devices to make use of the osteotomy, thereby reducing kyphosis and pelvic incidence and lordosis mismatch. X-rays confirmed appropriate localization of our instrumentation. Valsalva maneuver confirmed that there was no spinal leak. The wound was thoroughly irrigated. Hemostasis was maintained. The fascia was reapproximated bilaterally using 1-0 Vicryl. A 2-0 Vicryl was used for subcutaneous tissues. A running 3-0 subcuticular stitch was applied. Steri-Strips were applied. Sterile dressings were applied. The patient was extubated and taken to the recovery room in stable condition and was neurologically intact on arrival and had improvement of leg pain. SSEPs remained at their baseline. Needle and Ray-David counts were correct at the conclusion of the case.     Dictated By Rosi Berry M.D.  d: 06/15/2023 11:27:24  t: 06/15/2023 15:12:43  [facility] [id number]  PRP/

## 2023-06-16 NOTE — PROGRESS NOTES
Patient cleared by all MD's for discharge. IV removed. Dc paperwork and belongings sent with patient.

## 2023-06-19 NOTE — DISCHARGE SUMMARY
[facility]  Discharge Summary    Joy Joyner Patient Status:  Inpatient    1950 MRN [id number]   [facility] 3SW-A Attending No att. providers found   [address] Day # 2 PCP Clinton Mackay MD     Date of Admission: 2023    Date of Discharge: 2023    Admitting Diagnosis: L3-L5 STENOSIS; SPONDYLOLISTHESIS  Pre-op testing    Discharge Diagnosis: Patient Active Problem List:     Hyperglycemia     Syncope and collapse     SOWMYA (acute kidney injury) ([facility] [address]     Hypokalemia     Iliotibial band syndrome, left leg     Trochanteric bursitis of left hip     Pre-op testing      Hospital course: The patient was admitted on above date to undergo elective surgical intervention understanding risks and benefits to surgery after failing conservative care. Patient underwent   Surgical Procedures     Case IDs Date Procedure Surgeon Location Status    [id number] LUMBAR 3 - LUMBAR 4, LUMBAR 4 - LUMBAR 5 TRANSFORAMINAL LUMBAR INTERBODY FUSION WITH HARDWARE Misha Rachel MD [facility] MAIN OR Comp        Afterward, pt was brought to the PACU in stable condition. After the recovery room the patient was transferred to the floor using standard protocol orders. Once on the floor the patient was followed by spine service and medical services as well as appropriate ancillary consultations throughout the hospital stay. The patient participated in Physical and Occupational Therapy making steady progressive gains. Once deemed stable by all services the patient was discharged on the above date. Standard discharge instructions were given and they were asked to follow up in clinic in 2 weeks.      Disposition: Home Health Care Services      Discharge Medications: Discharge Medication List as of 2023  5:20 PM    START taking these medications    ondansetron 4 MG Oral Tablet Dispersible  Take 1 tablet (4 mg total) by mouth every 8 (eight) hours as needed for Nausea., Normal, Disp-20 tablet, R-0      CONTINUE these medications which have NOT CHANGED    CHOLECALCIFEROL OR  Take 1 capsule by mouth daily. , Historical    hydroCHLOROthiazide 12.5 MG Oral Tab  Take 1 tablet (12.5 mg total) by mouth daily. , Normal, Disp-30 tablet, R-0    gabapentin 100 MG Oral Cap  Take 1 capsule (100 mg total) by mouth nightly., Historical    tiZANidine 2 MG Oral Tab  Take 1 tablet (2 mg total) by mouth every 6 (six) hours as needed., Historical    loratadine 10 MG Oral Tab  Take 1 tablet (10 mg total) by mouth As Directed., Historical    Ascorbic Acid (VITAMIN C OR)  Take 1 tablet by mouth daily. , Historical    CALCIUM OR  Take 1 tablet by mouth daily. , Historical    Multiple Vitamins-Minerals (TAB-A-DEVYN MAXIMUM) Oral Tab  Take 1 tablet by mouth daily. , Historical    B Complex-C-Folic Acid (HM VITAMIN B COMPLEX/VITAMIN C) Oral Tab  Take 1 tablet by mouth daily. , Historical          Julien Santana  6/19/2023  2:16 PM

## 2024-06-12 ENCOUNTER — APPOINTMENT (OUTPATIENT)
Dept: URBAN - METROPOLITAN AREA CLINIC 247 | Age: 74
Setting detail: DERMATOLOGY
End: 2024-06-12

## 2024-06-12 DIAGNOSIS — L82.1 OTHER SEBORRHEIC KERATOSIS: ICD-10-CM

## 2024-06-12 DIAGNOSIS — D18.0 HEMANGIOMA: ICD-10-CM

## 2024-06-12 DIAGNOSIS — D22 MELANOCYTIC NEVI: ICD-10-CM

## 2024-06-12 DIAGNOSIS — L57.0 ACTINIC KERATOSIS: ICD-10-CM

## 2024-06-12 DIAGNOSIS — L81.4 OTHER MELANIN HYPERPIGMENTATION: ICD-10-CM

## 2024-06-12 PROBLEM — D18.01 HEMANGIOMA OF SKIN AND SUBCUTANEOUS TISSUE: Status: ACTIVE | Noted: 2024-06-12

## 2024-06-12 PROBLEM — D22.5 MELANOCYTIC NEVI OF TRUNK: Status: ACTIVE | Noted: 2024-06-12

## 2024-06-12 PROCEDURE — OTHER COUNSELING: OTHER

## 2024-06-12 PROCEDURE — 17003 DESTRUCT PREMALG LES 2-14: CPT

## 2024-06-12 PROCEDURE — 99203 OFFICE O/P NEW LOW 30 MIN: CPT | Mod: 25

## 2024-06-12 PROCEDURE — 17000 DESTRUCT PREMALG LESION: CPT

## 2024-06-12 PROCEDURE — OTHER MIPS QUALITY: OTHER

## 2024-06-12 PROCEDURE — OTHER LIQUID NITROGEN: OTHER

## 2024-06-12 ASSESSMENT — LOCATION ZONE DERM
LOCATION ZONE: TRUNK
LOCATION ZONE: NOSE
LOCATION ZONE: LEG

## 2024-06-12 ASSESSMENT — LOCATION DETAILED DESCRIPTION DERM
LOCATION DETAILED: RIGHT MID-UPPER BACK
LOCATION DETAILED: LEFT SUPERIOR UPPER BACK
LOCATION DETAILED: RIGHT INFERIOR UPPER BACK
LOCATION DETAILED: NASAL DORSUM
LOCATION DETAILED: NASAL SUPRATIP
LOCATION DETAILED: LEFT SUPERIOR MEDIAL UPPER BACK
LOCATION DETAILED: LEFT DISTAL PRETIBIAL REGION

## 2024-06-12 ASSESSMENT — LOCATION SIMPLE DESCRIPTION DERM
LOCATION SIMPLE: LEFT PRETIBIAL REGION
LOCATION SIMPLE: LEFT UPPER BACK
LOCATION SIMPLE: NOSE
LOCATION SIMPLE: RIGHT UPPER BACK

## 2024-06-12 NOTE — PROCEDURE: LIQUID NITROGEN
Detail Level: Detailed
Show Applicator Variable?: Yes
Consent: Risks include crusting, scabbing, blistering, scarring, darker or lighter pigmentary change, recurrence, incomplete removal and infection. The patient's consent was obtained prior to treatment.
Render Post-Care Instructions In Note?: no
Number Of Freeze-Thaw Cycles: 1 freeze-thaw cycle
Post-Care Instructions: Apply Vaseline to treated areas several times daily until crusting resolves. Wear SPF 30+ daily and sun protective clothing to reduce pigmentation faster.
Duration Of Freeze Thaw-Cycle (Seconds): 1
Application Tool (Optional): Liquid Nitrogen Sprayer

## 2024-08-02 ENCOUNTER — APPOINTMENT (OUTPATIENT)
Dept: GENERAL RADIOLOGY | Age: 74
End: 2024-08-02
Attending: EMERGENCY MEDICINE
Payer: MEDICARE

## 2024-08-02 ENCOUNTER — HOSPITAL ENCOUNTER (INPATIENT)
Facility: HOSPITAL | Age: 74
LOS: 3 days | Discharge: HOME HEALTH | End: 2024-08-05
Attending: EMERGENCY MEDICINE | Admitting: HOSPITALIST
Payer: MEDICARE

## 2024-08-02 DIAGNOSIS — I48.91 ATRIAL FIBRILLATION WITH RAPID VENTRICULAR RESPONSE (HCC): Primary | ICD-10-CM

## 2024-08-02 DIAGNOSIS — R42 POSTURAL DIZZINESS WITH PRESYNCOPE: ICD-10-CM

## 2024-08-02 DIAGNOSIS — R55 POSTURAL DIZZINESS WITH PRESYNCOPE: ICD-10-CM

## 2024-08-02 LAB
ALBUMIN SERPL-MCNC: 3.4 G/DL (ref 3.4–5)
ALBUMIN/GLOB SERPL: 0.9 {RATIO} (ref 1–2)
ALP LIVER SERPL-CCNC: 59 U/L
ALT SERPL-CCNC: 20 U/L
ANION GAP SERPL CALC-SCNC: 6 MMOL/L (ref 0–18)
APTT PPP: 25.5 SECONDS (ref 23–36)
APTT PPP: 57.9 SECONDS (ref 23–36)
AST SERPL-CCNC: 10 U/L (ref 15–37)
ATRIAL RATE: 159 BPM
BASOPHILS # BLD AUTO: 0.03 X10(3) UL (ref 0–0.2)
BASOPHILS NFR BLD AUTO: 0.4 %
BILIRUB SERPL-MCNC: 0.6 MG/DL (ref 0.1–2)
BUN BLD-MCNC: 37 MG/DL (ref 9–23)
CALCIUM BLD-MCNC: 9.3 MG/DL (ref 8.5–10.1)
CHLORIDE SERPL-SCNC: 104 MMOL/L (ref 98–112)
CO2 SERPL-SCNC: 26 MMOL/L (ref 21–32)
CREAT BLD-MCNC: 1.57 MG/DL
D DIMER PPP FEU-MCNC: 0.72 UG/ML FEU (ref ?–0.76)
EGFRCR SERPLBLD CKD-EPI 2021: 34 ML/MIN/1.73M2 (ref 60–?)
EOSINOPHIL # BLD AUTO: 0.06 X10(3) UL (ref 0–0.7)
EOSINOPHIL NFR BLD AUTO: 0.8 %
ERYTHROCYTE [DISTWIDTH] IN BLOOD BY AUTOMATED COUNT: 13.2 %
GLOBULIN PLAS-MCNC: 3.6 G/DL (ref 2.8–4.4)
GLUCOSE BLD-MCNC: 130 MG/DL (ref 70–99)
HCT VFR BLD AUTO: 41.2 %
HGB BLD-MCNC: 13.9 G/DL
IMM GRANULOCYTES # BLD AUTO: 0.03 X10(3) UL (ref 0–1)
IMM GRANULOCYTES NFR BLD: 0.4 %
INR BLD: 0.96 (ref 0.8–1.2)
LYMPHOCYTES # BLD AUTO: 1.27 X10(3) UL (ref 1–4)
LYMPHOCYTES NFR BLD AUTO: 15.9 %
MCH RBC QN AUTO: 30.6 PG (ref 26–34)
MCHC RBC AUTO-ENTMCNC: 33.7 G/DL (ref 31–37)
MCV RBC AUTO: 90.7 FL
MONOCYTES # BLD AUTO: 0.68 X10(3) UL (ref 0.1–1)
MONOCYTES NFR BLD AUTO: 8.5 %
NEUTROPHILS # BLD AUTO: 5.9 X10 (3) UL (ref 1.5–7.7)
NEUTROPHILS # BLD AUTO: 5.9 X10(3) UL (ref 1.5–7.7)
NEUTROPHILS NFR BLD AUTO: 74 %
OSMOLALITY SERPL CALC.SUM OF ELEC: 292 MOSM/KG (ref 275–295)
PLATELET # BLD AUTO: 255 10(3)UL (ref 150–450)
POTASSIUM SERPL-SCNC: 3.5 MMOL/L (ref 3.5–5.1)
PROT SERPL-MCNC: 7 G/DL (ref 6.4–8.2)
PROTHROMBIN TIME: 12.8 SECONDS (ref 11.6–14.8)
Q-T INTERVAL: 310 MS
QRS DURATION: 76 MS
QTC CALCULATION (BEZET): 468 MS
R AXIS: 26 DEGREES
RBC # BLD AUTO: 4.54 X10(6)UL
SODIUM SERPL-SCNC: 136 MMOL/L (ref 136–145)
T AXIS: 112 DEGREES
TROPONIN I SERPL HS-MCNC: 19 NG/L
TSI SER-ACNC: 1.83 MIU/ML (ref 0.55–4.78)
VENTRICULAR RATE: 137 BPM
WBC # BLD AUTO: 8 X10(3) UL (ref 4–11)

## 2024-08-02 PROCEDURE — 71045 X-RAY EXAM CHEST 1 VIEW: CPT | Performed by: EMERGENCY MEDICINE

## 2024-08-02 PROCEDURE — 99223 1ST HOSP IP/OBS HIGH 75: CPT | Performed by: HOSPITALIST

## 2024-08-02 PROCEDURE — 73560 X-RAY EXAM OF KNEE 1 OR 2: CPT | Performed by: EMERGENCY MEDICINE

## 2024-08-02 RX ORDER — ACETAMINOPHEN 500 MG
1000 TABLET ORAL EVERY 4 HOURS PRN
Status: DISCONTINUED | OUTPATIENT
Start: 2024-08-02 | End: 2024-08-05

## 2024-08-02 RX ORDER — HEPARIN SODIUM AND DEXTROSE 10000; 5 [USP'U]/100ML; G/100ML
INJECTION INTRAVENOUS CONTINUOUS
Status: DISCONTINUED | OUTPATIENT
Start: 2024-08-02 | End: 2024-08-03

## 2024-08-02 RX ORDER — ONDANSETRON 2 MG/ML
4 INJECTION INTRAMUSCULAR; INTRAVENOUS EVERY 6 HOURS PRN
Status: DISCONTINUED | OUTPATIENT
Start: 2024-08-02 | End: 2024-08-05

## 2024-08-02 RX ORDER — BISACODYL 10 MG
10 SUPPOSITORY, RECTAL RECTAL
Status: DISCONTINUED | OUTPATIENT
Start: 2024-08-02 | End: 2024-08-05

## 2024-08-02 RX ORDER — METOCLOPRAMIDE HYDROCHLORIDE 5 MG/ML
5 INJECTION INTRAMUSCULAR; INTRAVENOUS EVERY 8 HOURS PRN
Status: DISCONTINUED | OUTPATIENT
Start: 2024-08-02 | End: 2024-08-05

## 2024-08-02 RX ORDER — METOPROLOL SUCCINATE 25 MG/1
25 TABLET, EXTENDED RELEASE ORAL
Status: DISCONTINUED | OUTPATIENT
Start: 2024-08-03 | End: 2024-08-05

## 2024-08-02 RX ORDER — SODIUM CHLORIDE 9 MG/ML
INJECTION, SOLUTION INTRAVENOUS CONTINUOUS
Status: DISCONTINUED | OUTPATIENT
Start: 2024-08-02 | End: 2024-08-02

## 2024-08-02 RX ORDER — ONDANSETRON 2 MG/ML
4 INJECTION INTRAMUSCULAR; INTRAVENOUS EVERY 4 HOURS PRN
Status: DISCONTINUED | OUTPATIENT
Start: 2024-08-02 | End: 2024-08-02

## 2024-08-02 RX ORDER — MELATONIN
3 NIGHTLY PRN
Status: DISCONTINUED | OUTPATIENT
Start: 2024-08-02 | End: 2024-08-05

## 2024-08-02 RX ORDER — POLYETHYLENE GLYCOL 3350 17 G/17G
17 POWDER, FOR SOLUTION ORAL DAILY PRN
Status: DISCONTINUED | OUTPATIENT
Start: 2024-08-02 | End: 2024-08-05

## 2024-08-02 RX ORDER — SENNOSIDES 8.6 MG
17.2 TABLET ORAL NIGHTLY PRN
Status: DISCONTINUED | OUTPATIENT
Start: 2024-08-02 | End: 2024-08-05

## 2024-08-02 RX ORDER — SODIUM CHLORIDE 9 MG/ML
125 INJECTION, SOLUTION INTRAVENOUS CONTINUOUS
Status: DISCONTINUED | OUTPATIENT
Start: 2024-08-02 | End: 2024-08-05

## 2024-08-02 RX ORDER — ECHINACEA PURPUREA EXTRACT 125 MG
1 TABLET ORAL
Status: DISCONTINUED | OUTPATIENT
Start: 2024-08-02 | End: 2024-08-05

## 2024-08-02 RX ORDER — HEPARIN SODIUM AND DEXTROSE 10000; 5 [USP'U]/100ML; G/100ML
12 INJECTION INTRAVENOUS ONCE
Status: COMPLETED | OUTPATIENT
Start: 2024-08-02 | End: 2024-08-02

## 2024-08-02 RX ORDER — HEPARIN SODIUM 1000 [USP'U]/ML
60 INJECTION, SOLUTION INTRAVENOUS; SUBCUTANEOUS ONCE
Status: COMPLETED | OUTPATIENT
Start: 2024-08-02 | End: 2024-08-02

## 2024-08-02 NOTE — CONSULTS
[facility]  Cardiology Consultation    Ciara Hunter Patient Status:  Inpatient    1950 MRN [id number]   Location [facility] 3NE-A Attending Mic Romero MD   Hosp Day # 0 PCP Cheyenne Grier MD     Consults      Reason for Consultation     Afib        History of Present Illness     Ciara Hunter is a a(n) 74 year old female with HTN, on hydrochlorothiazide here with dizziness. She has had dizziness and syncope in the past - not correlated with afib, but similar to the symptoms she has now.    Her son is a  and wanted her to be evaluated and brought her to  ER and then to [place].     Chadsvasc- 4 ( Agex 2, Gender, HTN)    She was in AF with RVR- 135, now controlled with IV dilt. IV heparin started.        History:     History reviewed. No pertinent past medical history.  Past Surgical History:   Procedure Laterality Date    Appendectomy      Patient documented not to have experienced any of the following events Right 10/06/2015    Procedure: CARPAL TUNNEL RELEASE;  Surgeon: Byron Sutton MD;  Location: [facility]    Patient with preoperative order for iv antibiotic surgical site infect Right 10/06/2015    Procedure: CARPAL TUNNEL RELEASE;  Surgeon: Byron Sutton MD;  Location: [facility]    Revise median n/carpal tunnel surg Right 10/06/2015    Procedure: CARPAL TUNNEL RELEASE;  Surgeon: Byron Sutton MD;  Location: [facility]    Tonsillectomy       History reviewed. No pertinent family history.   reports that she has never smoked. She has never used smokeless tobacco. She reports that she does not drink alcohol and does not use drugs.      Allergies:     No Known Allergies      Medications       Current Facility-Administered Medications:     sodium chloride 0.9% infusion, 125 mL/hr, Intravenous, Continuous    dilTIAZem 15 mg BOLUS FROM BAG infusion, 15 mg, Intravenous, Q1H PRN    dilTIAZem (cardIZEM) 100 mg in sodium chloride 0.9% 100 mL IVPB-ADDV, 2.5-20 mg/hr, Intravenous,  Continuous    heparin (Porcine) 76554 units/250mL infusion ED INITIAL DOSE, 12 Units/kg/hr, Intravenous, Once    heparin (Porcine) 36996 units/250mL infusion ACS/AFIB CONTINUOUS, 200-3,000 Units/hr, Intravenous, Continuous      Review of Systems     10 point ROS was negative except  Dizziness      Telemetry:         Telemetry: AF- controlled VR      Physical Exam     Physical Exam   Blood pressure 102/69, pulse 101, temperature 98.5 °F (36.9 °C), temperature source Temporal, resp. rate 20, height 5' 2\" (1.575 m), weight 143 lb (64.9 kg), SpO2 98%.  Temp (24hrs), Av.5 °F (36.9 °C), Min:98.5 °F (36.9 °C), Max:98.5 °F (36.9 °C)    Wt Readings from Last 3 Encounters:   24 143 lb (64.9 kg)   23 142 lb (64.4 kg)   23 140 lb (63.5 kg)     Well appearing  NAD  PERRLA/EOMI  Neck veins not elevated  Carotids- no bruits  CTA bilaterally  Cardiac- irreg irreg   Abdomen- Soft,Nontender, normal BS  Extremities- pulses normal  Edema- no edema  Mood /Affect Congruent  Skin- no lesions            Lab/Radiology Results     Recent Labs   Lab 24  1157   *   BUN 37*   CREATSERUM 1.57*   EGFRCR 34*   CA 9.3      K 3.5      CO2 26.0     Recent Labs   Lab 24  1157   RBC 4.54   HGB 13.9   HCT 41.2   MCV 90.7   MCH 30.6   MCHC 33.7   RDW 13.2   NEPRELIM 5.90   WBC 8.0   .0         [unfilled]  No results for input(s): \"BNP\" in the last 168 hours.  Lab Results   Component Value Date    INR 0.96 2024    INR 0.96 2023     Lab Results   Component Value Date    TROP <0.045 2019         XR KNEE (1 OR 2 VIEWS), LEFT (CPT=73560)    Result Date: 2024  CONCLUSION:  There is moderate medial compartment and patellofemoral compartment osteoarthritis. Moderate chondrocalcinosis. No acute fracture.  No definite loose body. Mild prepatellar soft tissue swelling.  Tiny joint effusion suspected   LOCATION:  [place] [id number]   Dictated by (CST): Peña Nazario MD on 2024 at 12:45 PM      Finalized by (CST): Peña Nazario MD on 8/02/2024 at 12:47 PM       XR CHEST AP PORTABLE  (CPT=71045)    Result Date: 8/2/2024  CONCLUSION:  The lungs are hyperinflated consistent with COPD change.  There is evidence of previous granulomatous disease with calcified lung base granulomas and calcified left hilar lymph nodes.  No suspicious mass or pneumonia noted.  No pneumothorax. The heart size and vascularity are normal.  There is no effusion.  There is no CHF. Unremarkable bony structures. Stable exam compared to previous.   LOCATION:  [place] [address]      Dictated by (CST): Peña Nazario MD on 8/02/2024 at 12:40 PM     Finalized by (CST): Peña Nazario MD on 8/02/2024 at 12:42 PM       EKG 12 Lead    Result Date: 8/2/2024  Atrial fibrillation with rapid ventricular response Nonspecific ST and T wave abnormality Abnormal ECG When compared with ECG of 06-JUN-2023 14:46, Atrial fibrillation has replaced Sinus rhythm Vent. rate has increased BY  65 BPM Nonspecific T wave abnormality now evident in Inferior leads Nonspecific T wave abnormality now evident in Lateral leads       Problem List     Patient Active Problem List   Diagnosis    Hyperglycemia    Syncope and collapse    SOWMYA (acute kidney injury) (HCC)    Hypokalemia    Iliotibial band syndrome, left leg    Trochanteric bursitis of left hip    Pre-op testing    Atrial fibrillation with rapid ventricular response (HCC)    Postural dizziness with presyncope       Diagnostic Testing     ECG AF/RVR    TTE 2019  Conclusions:     1. Left ventricle: The cavity size was normal. Wall thickness was normal.      Systolic function was normal. The estimated ejection fraction was 65%. No      diagnostic evidence for regional wall motion abnormalities.   2. Mitral valve: Mildly calcified annulus. Trivial mitral regurgitation. No      mitral stenosis.   3. Left atrium: The left atrium was mildly dilated.   4. Right atrium: The atrium was mildly dilated.   5. Aortic  valve: Mild calcificiation. Trileaflet. No aortic stenosis. No      aortic regurgitation.   6. right ventricule: Normal right ventricle size and systolic function.     Impressions:  No previous study was available for comparison.   *       30 day event recorder  HOLTER MONITOR     INDICATION:  The patient is a 73-year-old female with primary diagnoses of syncope and collapse.     DATE OF INITIATION:  03/01/2023.     DATE OF TERMINATION:  03/30/2023.     PROCEDURE:  This is a good quality 30-day event monitor.     FINDINGS:  The patient's predominant rhythm seen was normal sinus.     There were rare PACs and PVCs seen.     When the patient was in normal sinus rhythm, the patient's ventricular rate ranged from  beats per minute with a mean average heart rate of 78 beats per minute seen.     The longest episode of tachycardia was for 33 minutes and 40 seconds that happened on 03/15/2023 at 4:55 p.m.     The longest episode of bradycardia was for 20 seconds on 03/28/2023 at 6:31 a.m.     There were no significant atrial or ventricular arrhythmias seen.     With the symptoms noted in the diary returned, on 03/01/2023 at 12:01 p.m., there was an accidental _____  push and the patient was in sinus rhythm with a heart rate of 69 beats per minute seen.     On 03/27/2023 at 9:31 a.m., there was another trigger. The patient was in sinus rhythm PACs with a heart rate of 84 beats per minute seen.     CONCLUSION:    1. This is a good quality 30-day event monitor.   2. The patient's predominant rhythm seen was normal sinus.   3. Rare PACs and PVCs were seen.   4. No significant atrial or ventricular arrhythmias were seen.   5. No significant pauses were noted.   6. With the symptoms noted in the diary returned, these can be correlated to PACs.         Sen Jackson MD       Assessment     Persistent Atrial Fibrillation- new onset  Chadsvasc- 4  On IV heparin/Dilt  Normal Trop/ Normal TSH Normal Hgb  HTN- on  hydrochlorothiazide  Renal Insufficiency      Plan     [place] [facility]- needs indefinite anticoagulation  Convert to po rate control tomorrow  Discussed options- they prefer expedited restoration of Sinus since she is travelling to [place] in early September  Her son brought up ablation- can discuss as an outpt.  NICOLAS/DCCV Monday if she does not convert over the weekend.        C5      Allie Bryan MD    Cardiac Electrophysiology  [facility] [facility]  8/2/2024  4:21 PM

## 2024-08-02 NOTE — PROGRESS NOTES
Patient arrived to floor via ambulance. Heparin drip running at 7ml/hr and cardizem drip running at 10ml/hr. Pt denies any pain, shortness of breath, dizziness or palpitations. Pt states that he family is aware she is in the hospital.

## 2024-08-02 NOTE — ED PROVIDER NOTES
Patient Seen in: ward Emergency Department In [place]      History     Chief Complaint   Patient presents with    Syncope     Stated Complaint: two syncopal episodes in last two days, low bp    Subjective:   HPI  This is a 74-year-old female who on Wednesday she went to go to the bathroom she states that she stood up felt kind of dizzy and fell getting off the toilet she states she she fell but never hit her head or passed out.  She just stood up felt dizzy and had an laid down and could not get up because she was so weak.  The patient states that that when her blood pressure drops when she stands up.  The patient denies any chest pain or shortness of breath this happened again again at 1 time again when she stood up and she felt dizzy fell onto her knees.  She denies any recent travel long trips she denies any headache denies any neck pain denies any chest pain denies any shortness of breath.  She denies any abdominal pain diarrhea or blood in her stools no recent travel no history of blood clot she has had history of syncopal episodes before.  Presently has no complaints of headache she has no neck pain no chest pain no abdominal pain no lower extremity pain no numbness or weakness.         Objective:   History reviewed. No pertinent past medical history.           Past Surgical History:   Procedure Laterality Date    Appendectomy      Patient documented not to have experienced any of the following events Right 10/06/2015    Procedure: CARPAL TUNNEL RELEASE;  Surgeon: Byron Sutton MD;  Location: [facility]    Patient with preoperative order for iv antibiotic surgical site infect Right 10/06/2015    Procedure: CARPAL TUNNEL RELEASE;  Surgeon: Byrno Sutton MD;  Location: [facility]    Revise median n/carpal tunnel surg Right 10/06/2015    Procedure: CARPAL TUNNEL RELEASE;  Surgeon: Byron Sutton MD;  Location: [facility]    Tonsillectomy                  Social History     Socioeconomic History     Marital status:    Tobacco Use    Smoking status: Never    Smokeless tobacco: Never   Vaping Use    Vaping status: Never Used   Substance and Sexual Activity    Alcohol use: Never    Drug use: Never              Review of Systems    Positive for stated Chief Complaint: Syncope    Other systems are as noted in HPI.  Constitutional and vital signs reviewed.      All other systems reviewed and negative except as noted above.    Physical Exam     ED Triage Vitals [08/02/24 1124]   /53   Pulse 96   Resp 16   Temp 98.5 °F (36.9 °C)   Temp src Temporal   SpO2 98 %   O2 Device None (Room air)       Current Vitals:   Vital Signs  BP: 102/69  Pulse: 101  Resp: 20  Temp: 98.5 °F (36.9 °C)  Temp src: Temporal    Oxygen Therapy  SpO2: 98 %  O2 Device: None (Room air)            Physical Exam    General: .  Pleasant female no respiratory distress at this present time   The patient is in no respiratory distress    HEENT: Atraumatic, conjunctiva are not pale.  There is no icterus.  Oral mucosa Is wet.  No facial trauma.  The neck is supple.    LUNGS: Clear to auscultation, there is no wheezing or retraction.  No crackles.    CV: Cardiovascular is cardiovascularly regular, and tachycardic.    ABD: The abdomen is soft nondistended nontender.  There is no rebound.  There is no guarding.    EXT: There is good pulses bilaterally.  There is no calf tenderness.  There is no rash noted.  There is no edema    NEURO: Alert and oriented x4.  Muscle strength and sensory exam is grossly normal.  And the patient is neurologically intact with no focal findings.      ED Course     Labs Reviewed   COMP METABOLIC PANEL (14) - Abnormal; Notable for the following components:       Result Value    Glucose 130 (*)     BUN 37 (*)     Creatinine 1.57 (*)     eGFR-Cr 34 (*)     AST 10 (*)     A/G Ratio 0.9 (*)     All other components within normal limits   TROPONIN I HIGH SENSITIVITY - Normal   D-DIMER - Normal   PTT, ACTIVATED - Normal   CBC  WITH DIFFERENTIAL WITH PLATELET    Narrative:     The following orders were created for panel order CBC With Differential With Platelet.  Procedure                               Abnormality         Status                     ---------                               -----------         ------                     CBC W/ DIFFERENTIAL[[id number]]                              Final result                 Please view results for these tests on the individual orders.   TSH W REFLEX TO FREE T4   PROTHROMBIN TIME (PT)   RAINBOW DRAW LAVENDER   RAINBOW DRAW LIGHT GREEN   RAINBOW DRAW BLUE   CBC W/ DIFFERENTIAL             The patient was placed on monitors, IV was started, blood was drawn.     No signs of trauma to head or neck.  She was not orthostatic which but but when she stands up she does get feel dizzy and lightheaded with standing up.  Neurologically intact this point no focal findings.    MDM      Workup was done to rule out an acute coronary syndrome, electrolyte imbalance.  Admission disposition: 8/2/2024 12:46 PM       The EKG shows  T atrial fibs with rapid ventricular response atrial fibs with rapid ventricular response here is no acute ST elevations or ischemic findings.  The rest of the EKG including rate rhythm axis and intervals I agree with the EKG report . The rate is  137  Atrial fibs with rapid ventricular response there is some nonspecific ST changes noted        Patient's troponin was negative, comprehensive shows some mild renal insufficiency, D-dimer was normal, CBC was grossly negative.             I personally reviewed the radiographs and my individual interpretation shows    No obvious pneumonia, pneumothorax.  She has been scheduled for knee surgery and she has somewhat chronic after she has no calf tenderness but will get a x-rays of the knee were done to rule out any progression of arthritis, fracture.  The left knee showed no obvious fracture, just with arthritis  Also reviewed official report  and it shows   XR KNEE (1 OR 2 VIEWS), LEFT (CPT=73560)    Result Date: 8/2/2024  PROCEDURE:  XR KNEE (1 OR 2 VIEWS), LEFT (CPT=73560)  COMPARISON:  None.  INDICATIONS:  two syncopal episodes in last two days, low bp, fall, knee pain  PATIENT STATED HISTORY: (As transcribed by Technologist)  Patient is scheduled for a total knee replacement to her left knee in the near future.  On 8/1/24, she had a syncopal episode which caused a fall and patient states she landed on her left knee.  She has an abrasion and pain to the anterior aspect of the knee.                CONCLUSION:  There is moderate medial compartment and patellofemoral compartment osteoarthritis. Moderate chondrocalcinosis. No acute fracture.  No definite loose body. Mild prepatellar soft tissue swelling.  Tiny joint effusion suspected   LOCATION:  [id number]   Dictated by (CST): Peña Nazario MD on 8/02/2024 at 12:45 PM     Finalized by (CST): Peña Nazario MD on 8/02/2024 at 12:47 PM       XR CHEST AP PORTABLE  (CPT=71045)    Result Date: 8/2/2024  PROCEDURE:  XR CHEST AP PORTABLE  (CPT=71045)  TECHNIQUE:  AP chest radiograph was obtained.  COMPARISON:  [facility], XR, XR CHEST PA + LAT CHEST (CPT=71046), 6/07/2023, 1:11 PM.  [facility] , XR, XR CHEST AP PORTABLE  (CPT=71045), 9/14/2019, 7:03 PM.  INDICATIONS:  two syncopal episodes in last two days, low bp  PATIENT STATED HISTORY: (As transcribed by Technologist)  For the past few days, patient has had sudden onset of dizziness which has led to syncopal episodes.  She states she had an episode overnight on 7/31/24 and then again the morning of 8/1/24.                 CONCLUSION:  The lungs are hyperinflated consistent with COPD change.  There is evidence of previous granulomatous disease with calcified lung base granulomas and calcified left hilar lymph nodes.  No suspicious mass or pneumonia noted.  No pneumothorax. The heart size and vascularity are normal.  There is no effusion.  There is no CHF.  Unremarkable bony structures. Stable exam compared to previous.   LOCATION:  [place] [address]      Dictated by (CST): Peña Nazario MD on 8/02/2024 at 12:40 PM     Finalized by (CST): Peña Nazario MD on 8/02/2024 at 12:42 PM        The patient was given Cardizem bolus, drip to get the heart rate better.  She was also placed on heparin I did talk to her prior to starting heparin she has she states definitely she did not hit her head she just went down to her knees after she felt dizzy and lightheaded and and want to make sure there is no fracture.  The knee.  X-ray showed no obvious fracture.  She states he never hit her head.  No history of bleeding disorder.  She is not anemic care.  The patient's case was discussed with the APN for [facility] will see her on consultation.  Cardiology will see her I discussed this case also with the [facility] Dr. Brooks.  The patient will be admitted for further evaluation    Repeat heart rate has come down she is not feeling dizzy or lightheaded no chest pain or shortness of breath.    A total of 35 minutes of critical care time (exclusive of billable procedures) was administered to manage the patient's unstable vital signs due to her atrial for with rapid ventricular response..  This involved direct patient intervention, complex decision making, and/or extensive discussions with the patient, family, and clinical staff.         Medical Decision Making      Disposition and Plan     Clinical Impression:  1. Atrial fibrillation with rapid ventricular response (HCC)    2. Postural dizziness with presyncope         Disposition:  Admit  8/2/2024 12:46 pm    Follow-up:  No follow-up provider specified.        Medications Prescribed:  Current Discharge Medication List                            Hospital Problems       Present on Admission  Date Reviewed: 6/16/2023            ICD-10-CM Noted POA    * (Principal) Atrial fibrillation with rapid ventricular response (HCC) I48.91 8/2/2024  Unknown

## 2024-08-02 NOTE — H&P
[facility]     Ciara Hunter Patient Status:  Inpatient    1950 MRN [id number]   Location [facility] 3NE-A Attending Mic Romero MD   Hosp Day # 0 PCP Cheyenne Grier MD     Chief Complaint:   Chief Complaint   Patient presents with    Syncope       Subjective:    History of Present Illness:     Ciara Hunter is a 74 year old female with a past medical history of hypertension.  She has had near syncopal episodes and dizziness.  She presented to the emergency room secondary to these complaints where she was found to be in rapid atrial fibrillation.    History/Other:    Past Medical History:  History reviewed. No pertinent past medical history.  Past Surgical History:   Past Surgical History:   Procedure Laterality Date    Appendectomy      Patient documented not to have experienced any of the following events Right 10/06/2015    Procedure: CARPAL TUNNEL RELEASE;  Surgeon: Byron Sutton MD;  Location: [facility]    Patient with preoperative order for iv antibiotic surgical site infect Right 10/06/2015    Procedure: CARPAL TUNNEL RELEASE;  Surgeon: Byron Sutton MD;  Location: [facility]    Revise median n/carpal tunnel surg Right 10/06/2015    Procedure: CARPAL TUNNEL RELEASE;  Surgeon: Byron Sutton MD;  Location: [facility]    Tonsillectomy        Family History:   History reviewed. No pertinent family history.  Social History:    reports that she has never smoked. She has never used smokeless tobacco. She reports that she does not drink alcohol and does not use drugs.     Allergies: No Known Allergies    Medications:    No current facility-administered medications on file prior to encounter.     Current Outpatient Medications on File Prior to Encounter   Medication Sig Dispense Refill    hydroCHLOROthiazide 12.5 MG Oral Tab Take 1 tablet (12.5 mg total) by mouth daily. 30 tablet 0       Review of Systems:   A comprehensive review of systems was completed.     Pertinent positives and negatives noted in the HPI.    Objective:   Physical Exam:    BP 98/55 (BP Location: Right arm)   Pulse 81   Temp 98 °F (36.7 °C) (Temporal)   Resp 21   Ht 5' 2\" (1.575 m)   Wt 143 lb (64.9 kg)   SpO2 98%   BMI 26.16 kg/m²   General: No acute distress, Alert  Respiratory: No rhonchi, no wheezes  Cardiovascular: S1, S2. IR IR, tachy  Abdomen: Soft, Non-tender, Non-distended, Positive bowel sounds  Neuro: No new focal deficits  Extremities: No edema      Results:    Labs:      Labs Last 24 Hours:  Recent Labs   Lab 08/02/24  1157   WBC 8.0   HGB 13.9   MCV 90.7   .0   INR 0.96       Recent Labs   Lab 08/02/24  1157   *   BUN 37*   CREATSERUM 1.57*   CA 9.3   ALB 3.4      K 3.5      CO2 26.0   ALKPHO 59   AST 10*   ALT 20   BILT 0.6   TP 7.0       Estimated Creatinine Clearance: 24.1 mL/min (A) (based on SCr of 1.57 mg/dL (H)).    Recent Labs   Lab 08/02/24  1157   TROPHS 19       Recent Labs   Lab 08/02/24  1157   PTP 12.8   INR 0.96       No results for input(s): \"TROP\", \"CK\" in the last 168 hours.      Imaging: Imaging data reviewed in Epic.    Assessment & Plan:      #Dizziness  Likely due to rapid a. Fib    #Rapid a. Fib  Cardizem drip  Start BB low dose  Heparin drip  Echo  TSH  WNL  DCCV if does not covernt    #SOWMYA ? CKD  Monitor Cr    #HTN  Hold thizide diuretic, likely permanently       All diagnosis' and recommendations discussed with patient and/or family in detail.      Plan of care discussed with ED physician      Mic Romero MD    Supplementary Documentation:     The 21st Century Cures Act makes medical notes like these available to patients in the interest of transparency. Please be advised this is a medical document. Medical documents are intended to carry relevant information, facts as evident, and the clinical opinion of the practitioner. The medical note is intended as peer to peer communication and may appear blunt or direct. It is  written in medical language and may contain abbreviations or verbiage that are unfamiliar.

## 2024-08-02 NOTE — ED INITIAL ASSESSMENT (HPI)
In the night weds night- got up to go to the bathroom- fell getting off the toilet- was on the floor 30 mins before she could get up and get into bed- has been checking bp at home and has found that bp drops when standing-  Denies any cp or sob

## 2024-08-03 ENCOUNTER — APPOINTMENT (OUTPATIENT)
Dept: CV DIAGNOSTICS | Facility: HOSPITAL | Age: 74
End: 2024-08-03
Attending: HOSPITALIST
Payer: MEDICARE

## 2024-08-03 LAB
ANION GAP SERPL CALC-SCNC: 8 MMOL/L (ref 0–18)
BASOPHILS # BLD AUTO: 0.02 X10(3) UL (ref 0–0.2)
BASOPHILS NFR BLD AUTO: 0.4 %
BUN BLD-MCNC: 29 MG/DL (ref 9–23)
CALCIUM BLD-MCNC: 8.9 MG/DL (ref 8.7–10.4)
CHLORIDE SERPL-SCNC: 111 MMOL/L (ref 98–112)
CO2 SERPL-SCNC: 22 MMOL/L (ref 21–32)
CREAT BLD-MCNC: 1.03 MG/DL
EGFRCR SERPLBLD CKD-EPI 2021: 56 ML/MIN/1.73M2 (ref 60–?)
EOSINOPHIL # BLD AUTO: 0.06 X10(3) UL (ref 0–0.7)
EOSINOPHIL NFR BLD AUTO: 1.2 %
ERYTHROCYTE [DISTWIDTH] IN BLOOD BY AUTOMATED COUNT: 13.2 %
GLUCOSE BLD-MCNC: 100 MG/DL (ref 70–99)
HCT VFR BLD AUTO: 32.3 %
HGB BLD-MCNC: 11.6 G/DL
IMM GRANULOCYTES # BLD AUTO: 0.02 X10(3) UL (ref 0–1)
IMM GRANULOCYTES NFR BLD: 0.4 %
LYMPHOCYTES # BLD AUTO: 1.39 X10(3) UL (ref 1–4)
LYMPHOCYTES NFR BLD AUTO: 26.7 %
MAGNESIUM SERPL-MCNC: 1.8 MG/DL (ref 1.6–2.6)
MCH RBC QN AUTO: 31.8 PG (ref 26–34)
MCHC RBC AUTO-ENTMCNC: 35.9 G/DL (ref 31–37)
MCV RBC AUTO: 88.5 FL
MONOCYTES # BLD AUTO: 0.53 X10(3) UL (ref 0.1–1)
MONOCYTES NFR BLD AUTO: 10.2 %
NEUTROPHILS # BLD AUTO: 3.18 X10 (3) UL (ref 1.5–7.7)
NEUTROPHILS # BLD AUTO: 3.18 X10(3) UL (ref 1.5–7.7)
NEUTROPHILS NFR BLD AUTO: 61.1 %
OSMOLALITY SERPL CALC.SUM OF ELEC: 298 MOSM/KG (ref 275–295)
PLATELET # BLD AUTO: 204 10(3)UL (ref 150–450)
POTASSIUM SERPL-SCNC: 3.3 MMOL/L (ref 3.5–5.1)
RBC # BLD AUTO: 3.65 X10(6)UL
SODIUM SERPL-SCNC: 141 MMOL/L (ref 136–145)
WBC # BLD AUTO: 5.2 X10(3) UL (ref 4–11)

## 2024-08-03 PROCEDURE — 93306 TTE W/DOPPLER COMPLETE: CPT | Performed by: HOSPITALIST

## 2024-08-03 PROCEDURE — 99233 SBSQ HOSP IP/OBS HIGH 50: CPT | Performed by: HOSPITALIST

## 2024-08-03 RX ORDER — POTASSIUM CHLORIDE 20 MEQ/1
40 TABLET, EXTENDED RELEASE ORAL ONCE
Status: COMPLETED | OUTPATIENT
Start: 2024-08-03 | End: 2024-08-03

## 2024-08-03 RX ORDER — MAGNESIUM OXIDE 400 MG/1
400 TABLET ORAL ONCE
Status: COMPLETED | OUTPATIENT
Start: 2024-08-03 | End: 2024-08-03

## 2024-08-03 NOTE — PROGRESS NOTES
[facility]   part of [facility]     Hospitalist Progress Note     Ciara Hunter Patient Status:  Inpatient    1950 MRN [id number]   Location [facility] 3NE-A Attending Dillon Marquez MD   Hosp Day # 1 PCP Cheyenne Grier MD     Chief Complaint: afib RVR    Subjective:     Patient in afib but rate controlled. Fatigued. No dizziness/palp    Objective:    Review of Systems:   A comprehensive review of systems was completed; pertinent positive and negatives stated in subjective.    Vital signs:  Temp:  [97.8 °F (36.6 °C)-98.4 °F (36.9 °C)] 98.1 °F (36.7 °C)  Pulse:  [] 66  Resp:  [16-22] 18  BP: ()/(52-79) 120/79  SpO2:  [95 %-98 %] 97 %    Physical Exam:    General: No acute distress  Respiratory: No wheezes, no rhonchi  Cardiovascular: S1, S2, irregular rate and rhythm  Abdomen: Soft, Non-tender, non-distended, positive bowel sounds  Neuro: No new focal deficits.   Extremities: No edema      Diagnostic Data:    Labs:  Recent Labs   Lab 24  1157 24  0721   WBC 8.0 5.2   HGB 13.9 11.6*   MCV 90.7 88.5   .0 204.0   INR 0.96  --        Recent Labs   Lab 24  1157 24  0721   * 100*   BUN 37* 29*   CREATSERUM 1.57* 1.03*   CA 9.3 8.9   ALB 3.4  --     141   K 3.5 3.3*    111   CO2 26.0 22.0   ALKPHO 59  --    AST 10*  --    ALT 20  --    BILT 0.6  --    TP 7.0  --        Estimated Creatinine Clearance: 36.8 mL/min (A) (based on SCr of 1.03 mg/dL (H)).    Recent Labs   Lab 24  1157   TROPHS 19       Recent Labs   Lab 24  1157   PTP 12.8   INR 0.96                  Microbiology    No results found for this visit on 24.      Imaging: Reviewed in Epic.    Medications:    lidocaine-menthol  1 patch Transdermal Daily    dilTIAZem  60 mg Oral 4 times per day    apixaban  5 mg Oral BID    metoprolol succinate  25 mg Oral Daily Beta Blocker       Assessment & Plan:      #Dizziness  Likely due to rapid a. Fib     #afib RVR  Cardizem  drip wean to PO today   Start BB low dose  Heparin drip, dc on doac. Will price eliquis  Echo pEF  TSH  WNL  DCCV if does not covernt on monday     #SOWMYA ? CKD  Monitor Cr     #HTN  Hold thizide diuretic, likely permanently     Images all reviewed independentnly. Clear chest, no fx knee    Dillon Marquez MD    Supplementary Documentation:     Quality:  DVT Mechanical Prophylaxis:     Early ambuation  DVT Pharmacologic Prophylaxis   Medication    apixaban (Eliquis) tab 5 mg                Code Status: Full Code  Gomez: No urinary catheter in place  Gomez Duration (in days):   Central line:    LACI:     Discharge is dependent on: course  At this point Ms. Hunter is expected to be discharge to: home    The 21st Century Cures Act makes medical notes like these available to patients in the interest of transparency. Please be advised this is a medical document. Medical documents are intended to carry relevant information, facts as evident, and the clinical opinion of the practitioner. The medical note is intended as peer to peer communication and may appear blunt or direct. It is written in medical language and may contain abbreviations or verbiage that are unfamiliar.             **Certification      PHYSICIAN Certification of Need for Inpatient Hospitalization - Initial Certification    Patient will require inpatient services that will reasonably be expected to span two midnight's based on the clinical documentation in H+P.   Based on patients current state of illness, I anticipate that, after discharge, patient will require TBD.     none...

## 2024-08-03 NOTE — CM/SW NOTE
CM placed Eliquis Free-30 Day coupon in patient's chart for patient use at preferred pharmacy (script printed).  Recommend patient follow up with ordering provider within 2-3 weeks to discuss affordable options (if needed).  Care team updated.      Update:  1415: Eliquis prescription sent to [facility] in [place]. CM called [facility] pharmacy at [phone] for cost inquiry; CM informed by pharmacist approximate cost with Medicare Part D plan is $355 per month.  Care team updated.     Sandi Daniel RN Case Manager [phone]

## 2024-08-03 NOTE — PROGRESS NOTES
Pt A&Ox4 Room Air  Resting in bed  Denies pain at this time  A-fib on Tele  Meds Given per Mar  Voids Standby Assist  Heparin Gtt infusing.   Cardizem Gtt infusing 10ml/Hr.   Cards Following.   Safety precaution maintained  Plan of Care Ongoing.

## 2024-08-03 NOTE — PROGRESS NOTES
[facility] PROGRESS NOTE      Ciara Hunter Patient Status:  Inpatient    1950 MRN [id number]   Location [facility] 3NE-A Attending Dillon Marquez MD   Hosp Day # 1 PCP Cheyenne Grier MD     Assessment/Plan     Persistent Atrial Fibrillation- new onset  HTN- on hydrochlorothiazide  Renal Insufficiency    - DOAC  - PO cardizem  - Echo is pending.  - NICOLAS guided DCCV Monday if needed.     Subjective:  No chest pain or shortness of breath.    ROS:  No abdominal pain, no cough, no fevers, chills, denies musculoskeletal pain.     Objective:  BP 94/56 (BP Location: Left arm)   Pulse 74   Temp 98.2 °F (36.8 °C) (Oral)   Resp 18   Ht 62\"   Wt 143 lb (64.9 kg)   SpO2 97%   BMI 26.16 kg/m²     Temp (24hrs), Av.2 °F (36.8 °C), Min:97.8 °F (36.6 °C), Max:98.5 °F (36.9 °C)      Intake/Output:  No intake or output data in the 24 hours ending 24 1045    Wt Readings from Last 2 Encounters:   24 143 lb (64.9 kg)   23 142 lb (64.4 kg)       Physical Exam:    General: Alert,  No apparent distress.  HEENT: No focal deficits.  Neck: No JVD, carotids 2+ no bruits.  Cardiac: Regular rate and rhythm, S1, S2 normal, no murmur  Lungs: Clear without wheezes, rales, rhonchi.  Normal excursions and effort.  Abdomen: Soft, non-tender.   Extremities: Without clubbing, cyanosis or edema.  Peripheral pulses are 2+.  Skin: Warm and dry.     Labs:  Lab Results   Component Value Date    WBC 5.2 2024    HGB 11.6 2024    HCT 32.3 2024    .0 2024     Lab Results   Component Value Date    INR 0.96 2024     Lab Results   Component Value Date     2024    K 3.3 2024     2024    CO2 22.0 2024    BUN 29 2024    CREATSERUM 1.03 2024     2024    MG 1.8 2024    CA 8.9 2024    ALT 20 2024    AST 10 2024    ALB 3.4 2024        Lab Results   Component Value Date    TROP  <0.045 09/14/2019        Medications:     magnesium oxide  400 mg Oral Once    potassium chloride  40 mEq Oral Once    lidocaine-menthol  1 patch Transdermal Daily    metoprolol succinate  25 mg Oral Daily Beta Blocker      sodium chloride 125 mL/hr (08/02/24 1545)    dilTIAZem 10 mg/hr (08/03/24 0541)    continuous dose heparin         Gabo Guillaume MD  8/3/2024  10:45 AM    Patient Active Problem List   Diagnosis    Hyperglycemia    Syncope and collapse    SOWMYA (acute kidney injury) (HCC)    Hypokalemia    Iliotibial band syndrome, left leg    Trochanteric bursitis of left hip    Pre-op testing    Atrial fibrillation with rapid ventricular response (HCC)    Postural dizziness with presyncope

## 2024-08-04 LAB
ANION GAP SERPL CALC-SCNC: 5 MMOL/L (ref 0–18)
APTT PPP: 29.2 SECONDS (ref 23–36)
BASOPHILS # BLD AUTO: 0.02 X10(3) UL (ref 0–0.2)
BASOPHILS NFR BLD AUTO: 0.5 %
BUN BLD-MCNC: 18 MG/DL (ref 9–23)
CALCIUM BLD-MCNC: 8.9 MG/DL (ref 8.7–10.4)
CHLORIDE SERPL-SCNC: 114 MMOL/L (ref 98–112)
CO2 SERPL-SCNC: 22 MMOL/L (ref 21–32)
CREAT BLD-MCNC: 0.89 MG/DL
EGFRCR SERPLBLD CKD-EPI 2021: 67 ML/MIN/1.73M2 (ref 60–?)
EOSINOPHIL # BLD AUTO: 0.09 X10(3) UL (ref 0–0.7)
EOSINOPHIL NFR BLD AUTO: 2.2 %
ERYTHROCYTE [DISTWIDTH] IN BLOOD BY AUTOMATED COUNT: 13.4 %
GLUCOSE BLD-MCNC: 95 MG/DL (ref 70–99)
HCT VFR BLD AUTO: 33.5 %
HGB BLD-MCNC: 11.7 G/DL
IMM GRANULOCYTES # BLD AUTO: 0.01 X10(3) UL (ref 0–1)
IMM GRANULOCYTES NFR BLD: 0.2 %
LYMPHOCYTES # BLD AUTO: 1.28 X10(3) UL (ref 1–4)
LYMPHOCYTES NFR BLD AUTO: 31.8 %
MAGNESIUM SERPL-MCNC: 1.8 MG/DL (ref 1.6–2.6)
MCH RBC QN AUTO: 31.9 PG (ref 26–34)
MCHC RBC AUTO-ENTMCNC: 34.9 G/DL (ref 31–37)
MCV RBC AUTO: 91.3 FL
MONOCYTES # BLD AUTO: 0.37 X10(3) UL (ref 0.1–1)
MONOCYTES NFR BLD AUTO: 9.2 %
NEUTROPHILS # BLD AUTO: 2.25 X10 (3) UL (ref 1.5–7.7)
NEUTROPHILS # BLD AUTO: 2.25 X10(3) UL (ref 1.5–7.7)
NEUTROPHILS NFR BLD AUTO: 56.1 %
OSMOLALITY SERPL CALC.SUM OF ELEC: 294 MOSM/KG (ref 275–295)
PLATELET # BLD AUTO: 187 10(3)UL (ref 150–450)
POTASSIUM SERPL-SCNC: 3.7 MMOL/L (ref 3.5–5.1)
POTASSIUM SERPL-SCNC: 3.7 MMOL/L (ref 3.5–5.1)
RBC # BLD AUTO: 3.67 X10(6)UL
SODIUM SERPL-SCNC: 141 MMOL/L (ref 136–145)
WBC # BLD AUTO: 4 X10(3) UL (ref 4–11)

## 2024-08-04 PROCEDURE — 99233 SBSQ HOSP IP/OBS HIGH 50: CPT | Performed by: HOSPITALIST

## 2024-08-04 RX ORDER — SODIUM CHLORIDE 9 MG/ML
INJECTION, SOLUTION INTRAVENOUS CONTINUOUS
OUTPATIENT
Start: 2024-08-04

## 2024-08-04 RX ORDER — MAGNESIUM OXIDE 400 MG/1
400 TABLET ORAL ONCE
Status: COMPLETED | OUTPATIENT
Start: 2024-08-04 | End: 2024-08-04

## 2024-08-04 NOTE — PLAN OF CARE
Assumed care of pt at 0730 hr- pt is a&0x4, denies pain, on RA, continuours cardiac and Spo2 monitoring is in place- pt in afib HR is controlled, PIV is infusing ordered Normal saline, up with SBA and her cane from home. Safety precautions are in place.    Orders placed for procedure tomorrow, NPO after midnight      Problem: PAIN - ADULT  Goal: Verbalizes/displays adequate comfort level or patient's stated pain goal  Description: INTERVENTIONS:  - Encourage pt to monitor pain and request assistance  - Assess pain using appropriate pain scale  - Administer analgesics based on type and severity of pain and evaluate response  - Implement non-pharmacological measures as appropriate and evaluate response  - Consider cultural and social influences on pain and pain management  - Manage/alleviate anxiety  - Utilize distraction and/or relaxation techniques  - Monitor for opioid side effects  - Notify MD/LIP if interventions unsuccessful or patient reports new pain  - Anticipate increased pain with activity and pre-medicate as appropriate  Outcome: Progressing     Problem: CARDIOVASCULAR - ADULT  Goal: Maintains optimal cardiac output and hemodynamic stability  Description: INTERVENTIONS:  - Monitor vital signs, rhythm, and trends  - Monitor for bleeding, hypotension and signs of decreased cardiac output  - Evaluate effectiveness of vasoactive medications to optimize hemodynamic stability  - Monitor arterial and/or venous puncture sites for bleeding and/or hematoma  - Assess quality of pulses, skin color and temperature  - Assess for signs of decreased coronary artery perfusion - ex. Angina  - Evaluate fluid balance, assess for edema, trend weights  Outcome: Progressing  Goal: Absence of cardiac arrhythmias or at baseline  Description: INTERVENTIONS:  - Continuous cardiac monitoring, monitor vital signs, obtain 12 lead EKG if indicated  - Evaluate effectiveness of antiarrhythmic and heart rate control medications as  ordered  - Initiate emergency measures for life threatening arrhythmias  - Monitor electrolytes and administer replacement therapy as ordered  Outcome: Progressing

## 2024-08-04 NOTE — PROGRESS NOTES
[facility]   part of [facility]     Hospitalist Progress Note     Ciara Hunter Patient Status:  Inpatient    1950 MRN [id number]   [facility] 3NE-A Attending Dillon Marquez MD   Hosp Day # 2 PCP Cheyenne Grier MD     Chief Complaint: afib RVR    Subjective:     Patient in afib but rate controlled. HR a bit higher. sympotmatic    Objective:    Review of Systems:   A comprehensive review of systems was completed; pertinent positive and negatives stated in subjective.    Vital signs:  Temp:  [97.5 °F (36.4 °C)-98.4 °F (36.9 °C)] 98.2 °F (36.8 °C)  Pulse:  [62-95] 85  Resp:  [10-23] 19  BP: (103-126)/(56-72) 121/68  SpO2:  [92 %-98 %] 96 %    Physical Exam:    General: No acute distress  Respiratory: No wheezes, no rhonchi  Cardiovascular: S1, S2, irregular rate and rhythm  Abdomen: Soft, Non-tender, non-distended, positive bowel sounds  Neuro: No new focal deficits.   Extremities: No edema      Diagnostic Data:    Labs:  Recent Labs   Lab 24  1157 24  0721 24  0657   WBC 8.0 5.2 4.0   HGB 13.9 11.6* 11.7*   MCV 90.7 88.5 91.3   .0 204.0 187.0   INR 0.96  --   --        Recent Labs   Lab 24  1157 24  0721 24  0657   * 100* 95   BUN 37* 29* 18   CREATSERUM 1.57* 1.03* 0.89   CA 9.3 8.9 8.9   ALB 3.4  --   --     141 141   K 3.5 3.3* 3.7  3.7    111 114*   CO2 26.0 22.0 22.0   ALKPHO 59  --   --    AST 10*  --   --    ALT 20  --   --    BILT 0.6  --   --    TP 7.0  --   --        Estimated Creatinine Clearance: 42.5 mL/min (based on SCr of 0.89 mg/dL).    Recent Labs   Lab 08/02/24  1157   TROPHS 19       Recent Labs   Lab 24  1157   PTP 12.8   INR 0.96                  Microbiology    No results found for this visit on 24.      Imaging: Reviewed in Epic.    Medications:    lidocaine-menthol  1 patch Transdermal Daily    dilTIAZem  60 mg Oral 4 times per day    apixaban  5 mg Oral BID    metoprolol succinate  25 mg  Oral Daily Beta Blocker       Assessment & Plan:      #Dizziness  Likely due to rapid a. Fib     #afib RVR  Cardizem drip wean to PO today   BB low dose  Heparin drip, dc on doac. eliquis very expensive. Price out xarelto. She does not want coumadin and will go with the cheaper option b/w xarelto and eliquis  Echo pEF  TSH  WNL  NICOLAS/DCCV tomorrow if does not convert. NPO at MN      #SOWMYA ? CKD  Monitor Cr     #HTN  Hold thizide diuretic, likely permanently       Dillon Marquez MD    Supplementary Documentation:     Quality:  DVT Mechanical Prophylaxis:     Early ambuation  DVT Pharmacologic Prophylaxis   Medication    apixaban (Eliquis) tab 5 mg                Code Status: Full Code  Gomez: No urinary catheter in place  Gomez Duration (in days):   Central line:    LACI:     Discharge is dependent on: course  At this point Ms. Hunter is expected to be discharge to: home    The 21st Century Cures Act makes medical notes like these available to patients in the interest of transparency. Please be advised this is a medical document. Medical documents are intended to carry relevant information, facts as evident, and the clinical opinion of the practitioner. The medical note is intended as peer to peer communication and may appear blunt or direct. It is written in medical language and may contain abbreviations or verbiage that are unfamiliar.             **Certification      PHYSICIAN Certification of Need for Inpatient Hospitalization - Initial Certification    Patient will require inpatient services that will reasonably be expected to span two midnight's based on the clinical documentation in H+P.   Based on patients current state of illness, I anticipate that, after discharge, patient will require TBD.

## 2024-08-04 NOTE — PROGRESS NOTES
[facility] PROGRESS NOTE      Ciara Hunter Patient Status:  Inpatient    1950 MRN [id number]   Location [facility] 3NE-A Attending Dillon Marquez MD   Hosp Day # 2 PCP Cheyenne Grier MD     Assessment/Plan     Persistent Atrial Fibrillation- new onset  HTN- on hydrochlorothiazide  Renal Insufficiency, resolved.     - DOAC  - PO cardizem  - Echo with normal LVEF. Mild LAE. Mod TR,   - NICOLAS guided DCCV Monday if needed.     Subjective:  No chest pain or shortness of breath.    ROS:  No abdominal pain, no cough, no fevers, chills, denies musculoskeletal pain.     Objective:  /56 (BP Location: Right arm)   Pulse 78   Temp 98.2 °F (36.8 °C) (Oral)   Resp 16   Ht 62\"   Wt 143 lb (64.9 kg)   SpO2 95%   BMI 26.16 kg/m²     Temp (24hrs), Av.1 °F (36.7 °C), Min:97.5 °F (36.4 °C), Max:98.4 °F (36.9 °C)      Intake/Output:    Intake/Output Summary (Last 24 hours) at 2024 1057  Last data filed at 2024 0809  Gross per 24 hour   Intake 120 ml   Output --   Net 120 ml       Wt Readings from Last 2 Encounters:   24 143 lb (64.9 kg)   23 142 lb (64.4 kg)       Physical Exam:    General: Alert,  No apparent distress.  HEENT: No focal deficits.  Neck: No JVD, carotids 2+ no bruits.  Cardiac: Regular rate and rhythm, S1, S2 normal, no murmur  Lungs: Clear without wheezes, rales, rhonchi.  Normal excursions and effort.  Abdomen: Soft, non-tender.   Extremities: Without clubbing, cyanosis or edema.  Peripheral pulses are 2+.  Skin: Warm and dry.     Labs:  Lab Results   Component Value Date    WBC 4.0 2024    HGB 11.7 2024    HCT 33.5 2024    .0 2024     Lab Results   Component Value Date    INR 0.96 2024     Lab Results   Component Value Date     2024    K 3.7 2024    K 3.7 2024     2024    CO2 22.0 2024    BUN 18 2024    CREATSERUM 0.89 2024    GLU 95 2024    MG 1.8  08/04/2024    CA 8.9 08/04/2024        Lab Results   Component Value Date    TROP <0.045 09/14/2019        Medications:     lidocaine-menthol  1 patch Transdermal Daily    dilTIAZem  60 mg Oral 4 times per day    apixaban  5 mg Oral BID    metoprolol succinate  25 mg Oral Daily Beta Blocker      sodium chloride 125 mL/hr (08/03/24 1256)       Gabo Guillaume MD  8/3/2024  10:45 AM    Patient Active Problem List   Diagnosis    Hyperglycemia    Syncope and collapse    SOWMYA (acute kidney injury) (HCC)    Hypokalemia    Iliotibial band syndrome, left leg    Trochanteric bursitis of left hip    Pre-op testing    Atrial fibrillation with rapid ventricular response (HCC)    Postural dizziness with presyncope

## 2024-08-04 NOTE — PLAN OF CARE
Pt A&Ox4 Room Air  Resting in bed  Denies pain at this time  A-fib on Tele  Meds Given per Mar  Voids Standby Assist  Heparin Gtt infusing/  Cardizem Gtt infusing stopped in AM.   Cardizem PO Started.   IVF infusing 0.9@125ml/Hr.    Cards Following.   Safety precaution maintained  Plan of Care Ongoing.                      Problem: PAIN - ADULT  Goal: Verbalizes/displays adequate comfort level or patient's stated pain goal  Description: INTERVENTIONS:  - Encourage pt to monitor pain and request assistance  - Assess pain using appropriate pain scale  - Administer analgesics based on type and severity of pain and evaluate response  - Implement non-pharmacological measures as appropriate and evaluate response  - Consider cultural and social influences on pain and pain management  - Manage/alleviate anxiety  - Utilize distraction and/or relaxation techniques  - Monitor for opioid side effects  - Notify MD/LIP if interventions unsuccessful or patient reports new pain  - Anticipate increased pain with activity and pre-medicate as appropriate  Outcome: Progressing     Problem: CARDIOVASCULAR - ADULT  Goal: Maintains optimal cardiac output and hemodynamic stability  Description: INTERVENTIONS:  - Monitor vital signs, rhythm, and trends  - Monitor for bleeding, hypotension and signs of decreased cardiac output  - Evaluate effectiveness of vasoactive medications to optimize hemodynamic stability  - Monitor arterial and/or venous puncture sites for bleeding and/or hematoma  - Assess quality of pulses, skin color and temperature  - Assess for signs of decreased coronary artery perfusion - ex. Angina  - Evaluate fluid balance, assess for edema, trend weights  Outcome: Progressing  Goal: Absence of cardiac arrhythmias or at baseline  Description: INTERVENTIONS:  - Continuous cardiac monitoring, monitor vital signs, obtain 12 lead EKG if indicated  - Evaluate effectiveness of antiarrhythmic and heart rate control medications as  ordered  - Initiate emergency measures for life threatening arrhythmias  - Monitor electrolytes and administer replacement therapy as ordered  Outcome: Progressing

## 2024-08-04 NOTE — PLAN OF CARE
Assumed care of pt at 0730 hr- pt is a&0x4, endorses left knee pain, on RA, continuours cardiac and Spo2 monitoring is in place, PIV is infusing ordered IV fluid, secondary PIV infusing Heparin gtt, up with cane and assit x1. Safety precautions are in place.    Cardizem gtt stopped, pt switched to PO Cardizem. Pt remains in AFIB, HR is controlled.      Problem: PAIN - ADULT  Goal: Verbalizes/displays adequate comfort level or patient's stated pain goal  Description: INTERVENTIONS:  - Encourage pt to monitor pain and request assistance  - Assess pain using appropriate pain scale  - Administer analgesics based on type and severity of pain and evaluate response  - Implement non-pharmacological measures as appropriate and evaluate response  - Consider cultural and social influences on pain and pain management  - Manage/alleviate anxiety  - Utilize distraction and/or relaxation techniques  - Monitor for opioid side effects  - Notify MD/LIP if interventions unsuccessful or patient reports new pain  - Anticipate increased pain with activity and pre-medicate as appropriate  Outcome: Progressing     Problem: CARDIOVASCULAR - ADULT  Goal: Maintains optimal cardiac output and hemodynamic stability  Description: INTERVENTIONS:  - Monitor vital signs, rhythm, and trends  - Monitor for bleeding, hypotension and signs of decreased cardiac output  - Evaluate effectiveness of vasoactive medications to optimize hemodynamic stability  - Monitor arterial and/or venous puncture sites for bleeding and/or hematoma  - Assess quality of pulses, skin color and temperature  - Assess for signs of decreased coronary artery perfusion - ex. Angina  - Evaluate fluid balance, assess for edema, trend weights  Outcome: Progressing  Goal: Absence of cardiac arrhythmias or at baseline  Description: INTERVENTIONS:  - Continuous cardiac monitoring, monitor vital signs, obtain 12 lead EKG if indicated  - Evaluate effectiveness of antiarrhythmic and heart  rate control medications as ordered  - Initiate emergency measures for life threatening arrhythmias  - Monitor electrolytes and administer replacement therapy as ordered  Outcome: Progressing

## 2024-08-05 ENCOUNTER — APPOINTMENT (OUTPATIENT)
Dept: CV DIAGNOSTICS | Facility: HOSPITAL | Age: 74
End: 2024-08-05
Payer: MEDICARE

## 2024-08-05 VITALS
DIASTOLIC BLOOD PRESSURE: 63 MMHG | OXYGEN SATURATION: 99 % | SYSTOLIC BLOOD PRESSURE: 109 MMHG | WEIGHT: 143 LBS | HEART RATE: 48 BPM | HEIGHT: 62 IN | BODY MASS INDEX: 26.31 KG/M2 | RESPIRATION RATE: 18 BRPM | TEMPERATURE: 98 F

## 2024-08-05 LAB — MAGNESIUM SERPL-MCNC: 1.9 MG/DL (ref 1.6–2.6)

## 2024-08-05 PROCEDURE — 99239 HOSP IP/OBS DSCHRG MGMT >30: CPT | Performed by: HOSPITALIST

## 2024-08-05 RX ORDER — DILTIAZEM HYDROCHLORIDE 120 MG/1
120 CAPSULE, EXTENDED RELEASE ORAL DAILY
Qty: 30 CAPSULE | Refills: 11 | Status: SHIPPED | OUTPATIENT
Start: 2024-08-05 | End: 2025-08-05

## 2024-08-05 RX ORDER — METOPROLOL SUCCINATE 25 MG/1
25 TABLET, EXTENDED RELEASE ORAL
Qty: 30 TABLET | Refills: 2 | Status: SHIPPED | OUTPATIENT
Start: 2024-08-06

## 2024-08-05 RX ORDER — DILTIAZEM HYDROCHLORIDE 120 MG/1
120 CAPSULE, EXTENDED RELEASE ORAL DAILY
Status: DISCONTINUED | OUTPATIENT
Start: 2024-08-05 | End: 2024-08-05

## 2024-08-05 NOTE — PROGRESS NOTES
Cardiology Progress Note        Ciara Hunter Patient Status:  Inpatient    1950 MRN [id number]   [facility] 3NE-A Attending Dillon Marquez MD   Hosp Day # 3 PCP Cheyenne Grier MD     Subjective:  Converted to NSR 9 pm .    Medications:   lidocaine-menthol  1 patch Transdermal Daily    dilTIAZem  60 mg Oral 4 times per day    apixaban  5 mg Oral BID    metoprolol succinate  25 mg Oral Daily Beta Blocker       Continuous Infusions:   sodium chloride 125 mL/hr (24 1256)         Allergies:  No Known Allergies      Intake/Output:  No intake or output data in the 24 hours ending 24 0956        Last 3 Weights   24 1548 143 lb (64.9 kg)   24 1124 135 lb (61.2 kg)   23 0914 142 lb (64.4 kg)   23 1155 140 lb (63.5 kg)   23 1403 140 lb (63.5 kg)            Physical Exam:    Temp:  [97.3 °F (36.3 °C)-98.6 °F (37 °C)] 97.8 °F (36.6 °C)  Pulse:  [] 48  Resp:  [13-25] 18  BP: (109-144)/() 109/63  SpO2:  [92 %-99 %] 99 %    General: No apparent distress  HEENT: No focal deficits.  Neck: supple. JVP normal  Cardiac: Regular rhythm, S1, S2 normal,  rub or gallop.  No murmur.  Lungs: CTA  Abdomen: Soft, non-tender.   Extremities: No edema  Neurologic: no focal deficits  Skin: Warm and dry.     Telemetry: sinus    EKG:      Echo:      Cardiac Cath:      Labs:  HEM:  Recent Labs   Lab 24  1157 24  0721 24  0657   WBC 8.0 5.2 4.0   HGB 13.9 11.6* 11.7*   .0 204.0 187.0       Chem:  Recent Labs   Lab 24  1157 24  0721 24  0657    141 141   K 3.5 3.3* 3.7  3.7    111 114*   CO2 26.0 22.0 22.0   BUN 37* 29* 18   CREATSERUM 1.57* 1.03* 0.89   CA 9.3 8.9 8.9   MG  --  1.8 1.8   * 100* 95       Recent Labs   Lab 24  1157   ALT 20   AST 10*   ALB 3.4       No results for input(s): \"TROP\", \"CK\" in the last 168 hours.    Recent Labs   Lab 24  1157   PTP 12.8   INR 0.96                  Impression:  New onset afib, converting spontaneously after several days on 8/4/24.  Echo unremarkable.  HTN, well controlled.  ARF - resolved with fluids, on HyCT as o/p.          Plan:  Underlying sinus ana on dilt 60 mg q6 hours and 25 mg of toprol.  Ok for dc on cardizem  mg daily, toprol 25 mg daily.  Same eliquis.  Follow up with Dr. Bryan in a few weeks.        Dylon Sanchez MD  8/5/2024  9:56 AM  L3

## 2024-08-05 NOTE — PROGRESS NOTES
NURSING DISCHARGE NOTE    Discharged Home via Wheelchair.  Accompanied by RN  Belongings Taken by patient/family.    Discharged instructions discussed with patient.  Instructed to  her medications in [facility], per patient she already received a call from [facility] regarding her Eliquis, prescription for Diltiazem and Metoprolol given.  Instructed to follow the prescribed medications and diet.  Instructed to comply with her medical appointment.  Vital signs are within parameters.  IV/telemetry removed.

## 2024-08-05 NOTE — DISCHARGE SUMMARY
[facility] HOSPITALIST  DISCHARGE SUMMARY     Ciara Hunter Patient Status:  Inpatient    1950 MRN [id number]   Location [facility] 3NE-A Attending Dillon Marquez MD   Hosp Day # 3 PCP Cheyenne Grier MD     Date of Admission: 2024  Date of Discharge:   24    Discharge Disposition: Home Health Care Services    Discharge Diagnosis:  #Dizziness  Likely due to rapid a. Fib     #afib RVR  Cardizem drip wean to PO   BB low dose  Heparin drip, dc on doac. eliquis very expensive but xarelto similar price. She does not want coumadin and will go with the cheaper option b/w xarelto and eliquis  Echo pEF  TSH  WNL  Self converted last night     #SOWMYA ? CKD  Monitor Cr     #HTN  Hold thizide diuretic, likely permanently        History of Present Illness: marija Hunter is a 74 year old female with a past medical history of hypertension.  She has had near syncopal episodes and dizziness.  She presented to the emergency room secondary to these complaints where she was found to be in rapid atrial fibrillation.     Brief Synopsis: pt w/ new onset afib w/ RVR. Started cardizem gtt and hep gtt. Was going to do NICOLAS/DCCV but pt eventually self converted to NSR. To DC on PO cardizem and DOAC. Eliquis and xarelto similar price around $350/month but pt declined coumadin as an option. 1 month free coupon given.     Lace+ Score: 37  59-90 High Risk  29-58 Medium Risk  0-28   Low Risk       TCM Follow-Up Recommendation:  LACE 29-58: Moderate Risk of readmission after discharge from the hospital.      Procedures during hospitalization:   none    Incidental or significant findings and recommendations (brief descriptions):  none    Lab/Test results pending at Discharge:   non    Consultants:  cards    Discharge Medication List:     Discharge Medications        START taking these medications        Instructions Prescription details   apixaban 5 MG Tabs  Commonly known as: Eliquis      Take 1 tablet (5 mg total) by mouth 2 (two) times  daily.   Quantity: 60 tablet  Refills: 0     dilTIAZem HCl ER Beads 120 MG Cp24  Commonly known as: TIAZAC      Take 1 capsule (120 mg total) by mouth daily.   Quantity: 30 capsule  Refills: 11     metoprolol succinate ER 25 MG Tb24  Commonly known as: Toprol XL  Start taking on: 2024      Take 1 tablet (25 mg total) by mouth Daily Beta Blocker.   Quantity: 30 tablet  Refills: 2            STOP taking these medications      hydroCHLOROthiazide 12.5 MG Tabs                  Where to Get Your Medications        These medications were sent to [place], [place] - [address] [place] [address], [address] [phone], [phone]  [address] [place] [address], [place] [address], [facility] [address]      Phone: [phone]   apixaban 5 MG Tabs       Please  your prescriptions at the location directed by your doctor or nurse    Bring a paper prescription for each of these medications  dilTIAZem HCl ER Beads 120 MG Cp24  metoprolol succinate ER 25 MG Tb24         [place]    Follow-up appointment:   Allie Bryan MD  [facility] [address] [address]  [phone]    Follow up in 2 week(s)      Cheyenne Grier MD  [address] [facility] [address]  [phone]    Schedule an appointment as soon as possible for a visit      Appointments for Next 30 Days 2024 - 2024      None            Vital signs:  Temp:  [97.3 °F (36.3 °C)-98.6 °F (37 °C)] 97.8 °F (36.6 °C)  Pulse:  [] 48  Resp:  [13-25] 18  BP: (109-144)/() 109/63  SpO2:  [96 %-99 %] 99 %    Physical Exam:    General: No acute distress   Lungs: clear to auscultation  Cardiovascular: S1, S2  Abdomen: Soft      -----------------------------------------------------------------------------------------------  PATIENT DISCHARGE INSTRUCTIONS: See electronic chart    Dillon Marquez MD    Total time spent on discharge plannin minutes     The  Cures Act makes medical notes like these available to patients  in the interest of transparency. Please be advised this is a medical document. Medical documents are intended to carry relevant information, facts as evident, and the clinical opinion of the practitioner. The medical note is intended as peer to peer communication and may appear blunt or direct. It is written in medical language and may contain abbreviations or verbiage that are unfamiliar.

## 2024-08-05 NOTE — PLAN OF CARE
Assumed pt care at 1930  Aox4, RA, VSS  Tele-Afib/NSR  Reported pain-administered PRN Tylenol  Cardiac diet/ NPO starting 0000   Noncardiac electrolyte replacement protocol  Continent  Ambulatory  Safety precautions in place  Bed in lowest position and call light within reach  Updated with plan of care  All needs met at this time  Will continue plan of care    2100: ambulated around the unit      Problem: PAIN - ADULT  Goal: Verbalizes/displays adequate comfort level or patient's stated pain goal  Description: INTERVENTIONS:  - Encourage pt to monitor pain and request assistance  - Assess pain using appropriate pain scale  - Administer analgesics based on type and severity of pain and evaluate response  - Implement non-pharmacological measures as appropriate and evaluate response  - Consider cultural and social influences on pain and pain management  - Manage/alleviate anxiety  - Utilize distraction and/or relaxation techniques  - Monitor for opioid side effects  - Notify MD/LIP if interventions unsuccessful or patient reports new pain  - Anticipate increased pain with activity and pre-medicate as appropriate  Outcome: Progressing     Problem: CARDIOVASCULAR - ADULT  Goal: Maintains optimal cardiac output and hemodynamic stability  Description: INTERVENTIONS:  - Monitor vital signs, rhythm, and trends  - Monitor for bleeding, hypotension and signs of decreased cardiac output  - Evaluate effectiveness of vasoactive medications to optimize hemodynamic stability  - Monitor arterial and/or venous puncture sites for bleeding and/or hematoma  - Assess quality of pulses, skin color and temperature  - Assess for signs of decreased coronary artery perfusion - ex. Angina  - Evaluate fluid balance, assess for edema, trend weights  Outcome: Progressing  Goal: Absence of cardiac arrhythmias or at baseline  Description: INTERVENTIONS:  - Continuous cardiac monitoring, monitor vital signs, obtain 12 lead EKG if indicated  -  Evaluate effectiveness of antiarrhythmic and heart rate control medications as ordered  - Initiate emergency measures for life threatening arrhythmias  - Monitor electrolytes and administer replacement therapy as ordered  Outcome: Progressing     Problem: SAFETY ADULT - FALL  Goal: Free from fall injury  Description: INTERVENTIONS:  - Assess pt frequently for physical needs  - Identify cognitive and physical deficits and behaviors that affect risk of falls.  - [place] fall precautions as indicated by assessment.  - Educate pt/family on patient safety including physical limitations  - Instruct pt to call for assistance with activity based on assessment  - Modify environment to reduce risk of injury  - Provide assistive devices as appropriate  - Consider OT/PT consult to assist with strengthening/mobility  - Encourage toileting schedule  Outcome: Progressing

## 2024-08-05 NOTE — CDS QUERY
DOCUMENTATION CLARIFICATION QUERY  Dear Dr. Marquez,  Please further specify the patient's diagnosis of  Atrial fibrillation   [  x ] New onset Atrial fibrillation   [   ] Persistent Atrial fibrillation   [   ] Other  (please specify) _________________    CLINICAL INDICATORS:   -8/2/24 ED: Clinical Impression:  1.Atrial fibrillation with rapid ventricular response   -8/2 H&P: #Rapid a. Fib … DCCV if does not covert    -8/2/24: Cardiology: Persistent Atrial fibrillation - new onset   -8/5 Cardiology: New onset afib, converting spontaneously after several days on 8/4/24        RISK FACTORS:  HTN     TREATMENT:  EKG -  Cardiology EP consult - IV heparin/diltiazem - ECHO Cardizem drip  Start BB low dose                                       Use of terms such as suspected, possible, or probable (associated with a specific diagnosis that is being evaluated, monitored, or treated as if it exists) are acceptable and can be coded in the inpatient setting, when documented at the time of discharge.     Jeannie Storud RN, BSN, CWOCN [facility] Clinical  [phone]                                                                                    THIS FORM IS A PART OF THE PERMANENT MEDICAL RECORD

## 2024-08-05 NOTE — CM/SW NOTE
ANAID received order to price check Eliquis. Per previous notes, CM was provided co-pay of $355/month after Medicare part D for Eliquis from [facility]. CM provided Eliquis 30 day coupon card on pt's chart. RN inquired if Xarelto can be price checked, informed RN Xarelto script is needed in order to price check.     ANAID reviewed pt's medications and noted Xarelto script was [facility]. ANAID spoke with Catalina at [facility] [phone] who stated [facility] is not contracted with pt's insurance and cannot provide co-pay information. Per previous notes, pt uses [facility] pharmacy in [place]. Updated Charge RN.    Addendum (1pm) - ANAID spoke with RN who stated Xarelto co-pay is $349/month. ANAID received message from hospitalist stating pt will discharge on Eliquis. ANAID messaged RN to provide pt with 30 day Eliquis coupon prior to discharge.     TREASURE Jiménez  Discharge Planner

## 2024-08-05 NOTE — PLAN OF CARE
Assumed patient care @ 07:30.  Alert and oriented x 4.  Room air.  Denies pain.  Ambulatory with cane, stand by assist.  NSR/SB on telemetry.  Safety fall precautions maintained.  Needs attended, call light within her reach.  Bed in lowest position.  Sitting in the chair.  Problem: PAIN - ADULT  Goal: Verbalizes/displays adequate comfort level or patient's stated pain goal  Description: INTERVENTIONS:  - Encourage pt to monitor pain and request assistance  - Assess pain using appropriate pain scale  - Administer analgesics based on type and severity of pain and evaluate response  - Implement non-pharmacological measures as appropriate and evaluate response  - Consider cultural and social influences on pain and pain management  - Manage/alleviate anxiety  - Utilize distraction and/or relaxation techniques  - Monitor for opioid side effects  - Notify MD/LIP if interventions unsuccessful or patient reports new pain  - Anticipate increased pain with activity and pre-medicate as appropriate  Outcome: Progressing

## 2024-08-08 ENCOUNTER — HOSPITAL ENCOUNTER (OUTPATIENT)
Dept: LAB | Facility: HOSPITAL | Age: 74
Discharge: HOME | End: 2024-08-08
Attending: NURSE PRACTITIONER
Payer: MEDICARE

## 2024-08-08 LAB
ANION GAP SERPL CALC-SCNC: 3 MMOL/L (ref 0–18)
BUN BLD-MCNC: 23 MG/DL (ref 9–23)
CALCIUM BLD-MCNC: 10.1 MG/DL (ref 8.7–10.4)
CHLORIDE SERPL-SCNC: 109 MMOL/L (ref 98–112)
CO2 SERPL-SCNC: 27 MMOL/L (ref 21–32)
CREAT BLD-MCNC: 1.08 MG/DL
EGFRCR SERPLBLD CKD-EPI 2021: 53 ML/MIN/1.73M2 (ref 60–?)
FASTING STATUS PATIENT QL REPORTED: NO
GLUCOSE BLD-MCNC: 102 MG/DL (ref 70–99)
OSMOLALITY SERPL CALC.SUM OF ELEC: 292 MOSM/KG (ref 275–295)
POTASSIUM SERPL-SCNC: 4.3 MMOL/L (ref 3.5–5.1)
SODIUM SERPL-SCNC: 139 MMOL/L (ref 136–145)

## 2024-08-08 PROCEDURE — 80048 BASIC METABOLIC PNL TOTAL CA: CPT | Performed by: NURSE PRACTITIONER

## 2024-08-08 PROCEDURE — 36415 COLL VENOUS BLD VENIPUNCTURE: CPT | Performed by: NURSE PRACTITIONER

## 2024-09-25 NOTE — H&P
The above referenced H&P was reviewed by REGULO Obrien on 10/1/2024, the patient was examined and no significant changes have occurred in the patient's condition since the H&P was performed.  Risks and benefits were discussed, proceed with procedure as planned.      Danette ARIAS

## 2024-09-26 RX ORDER — VITAMIN E 268 MG
1 CAPSULE ORAL DAILY
COMMUNITY

## 2024-09-26 RX ORDER — LORATADINE 10 MG/1
10 TABLET ORAL AS NEEDED
COMMUNITY

## 2024-09-26 RX ORDER — ALFALFA 650 MG
1 TABLET ORAL DAILY
COMMUNITY

## 2024-09-26 RX ORDER — MULTIVIT WITH MINERALS/LUTEIN
1 TABLET ORAL DAILY
COMMUNITY

## 2024-09-26 NOTE — PAT NURSING NOTE
Per PAT encounter/MyChart message sent to pt/took notes as well:    Preprocedure Instructions     Visitor Instructions     Adult Patients:  You can have a  come with you on the day of your procedure.     PreOp Instructions     You are scheduled for: a Cardiac Procedure     Date of Procedure: 10/01/24 Tuesday     Diet Instructions:  You may eat as you normally would though we suggest a light breakfast.     Medications:  There are no restrictions to your medications; take them as you normally would.     Skin Prep: Shower with antibacterial soap using a clean washcloth, prior to procedure. Once dried off, no lotions/powders/creams/ointments, etc.      Arrival Time: The day prior to your procedure (Monday) you will receive a phone call before 6:00 pm with your arrival time. If you haven't received a phone call, please check your voicemail messages., If you did not receive a voice mail and it is after 6:00 pm, please call the nursing supervisor at [phone].     Driving After Procedure: No Sedation - You may drive self home     Discharge Teaching: Your nurse will give you specific instructions before discharge;Most people can resume normal activities in 2-3 days;Any questions, please call the physician's office      parking is available starting at 6 am or park in the [facility]. Check in at the [facility] reception desk. Our  will be there to check you in for your procedure. Please bring your insurance cards and ID with you.     Please DO NOT respond to this message, the inbasket is not monitored for messages. For any questions, please call the physician's office.

## 2024-10-01 ENCOUNTER — HOSPITAL ENCOUNTER (OUTPATIENT)
Dept: INTERVENTIONAL RADIOLOGY/VASCULAR | Facility: HOSPITAL | Age: 74
Discharge: HOME | End: 2024-10-01
Attending: INTERNAL MEDICINE | Admitting: INTERNAL MEDICINE
Payer: MEDICARE

## 2024-10-01 VITALS
BODY MASS INDEX: 25.21 KG/M2 | SYSTOLIC BLOOD PRESSURE: 164 MMHG | HEIGHT: 62 IN | RESPIRATION RATE: 14 BRPM | WEIGHT: 137 LBS | TEMPERATURE: 96 F | OXYGEN SATURATION: 98 % | DIASTOLIC BLOOD PRESSURE: 74 MMHG | HEART RATE: 63 BPM

## 2024-10-01 DIAGNOSIS — R55 SYNCOPE: ICD-10-CM

## 2024-10-01 PROCEDURE — 33285 INSJ SUBQ CAR RHYTHM MNTR: CPT | Performed by: NURSE PRACTITIONER

## 2024-10-01 PROCEDURE — 0JH632Z INSERTION OF MONITORING DEVICE INTO CHEST SUBCUTANEOUS TISSUE AND FASCIA, PERCUTANEOUS APPROACH: ICD-10-PCS | Performed by: NURSE PRACTITIONER

## 2024-10-01 RX ORDER — LIDOCAINE HYDROCHLORIDE AND EPINEPHRINE BITARTRATE 20; .01 MG/ML; MG/ML
INJECTION, SOLUTION SUBCUTANEOUS
Status: COMPLETED
Start: 2024-10-01 | End: 2024-10-01

## 2024-10-01 RX ORDER — LIDOCAINE HYDROCHLORIDE AND EPINEPHRINE 10; 10 MG/ML; UG/ML
10 INJECTION, SOLUTION INFILTRATION; PERINEURAL ONCE
Status: DISCONTINUED | OUTPATIENT
Start: 2024-10-01 | End: 2024-10-01

## 2024-10-01 NOTE — DISCHARGE INSTRUCTIONS
Follow up at pacemaker clinic on 10/9 at 2:30 pm    See post loop insertion discharge instruction sheet.

## 2024-10-01 NOTE — PROCEDURES
[facility]  Implantable Loop Recorder Implant Procedure Note    Ciara Hunter Patient Status:  Outpatient in a Bed    1950 MRN [id number]   Location [facility] INTERVENTIONAL SUITES Attending No att. providers found   Hosp Day # 0 PCP Cheyenne Grier MD     OPERATION(S) PERFORMED:   1. Implantable Loop Recorder Implantation     : Danette ARIAS  INDICATION: syncope, PAfib  COMPLICATIONS: None      ESTIMATED BLOOD LOSS: Minimal.  SEDATION:None       METHODS: The patient was brought to the EP lab in a nonsedated state after providing informed consent. The area if the ILR was cleaned, prepped and draped in sterile fashion.  After administering 1% lidocaine for local anesthesia, an incision was made with the provided tool. The ILR was inserted under the skin. 4th ICS, lateral position.     Hemostasis was achieved with manual pressure.    Dermabond and Steristrips were placed with a occlusive dressing.     CONCLUSIONS:   1. Status post successful implantation of a Medtronic Linq II ILR.  R waves measured 0.16mV, but visibly rhythm evident.     Danette ARIAS  [facility]

## 2024-10-01 NOTE — PROGRESS NOTES
Pt s/p Linq insert by Danette ARIAS today. [place] [employer] rep at bedside assisting. Prior to implant, pt chest scrubbed with hibiclens. Post linq implant, left chest with steri-strips and mepilex dressing. CDI. No bleeding or hematoma. Discharge instructions reviewed with pt and copy given. Pt given bedside monitor to bring home. After procedure pt drank orange juice and had cookies. Pt stated she felt good and was able to be discharged home. Pt ambulate out using cane. Pt refused a wheelchair. Pt left with belongings. Pt drove self home. No sedation given.

## 2025-06-12 ENCOUNTER — APPOINTMENT (OUTPATIENT)
Dept: URBAN - METROPOLITAN AREA CLINIC 247 | Age: 75
Setting detail: DERMATOLOGY
End: 2025-06-12

## 2025-06-12 DIAGNOSIS — D18.0 HEMANGIOMA: ICD-10-CM

## 2025-06-12 DIAGNOSIS — L82.1 OTHER SEBORRHEIC KERATOSIS: ICD-10-CM

## 2025-06-12 DIAGNOSIS — D22 MELANOCYTIC NEVI: ICD-10-CM

## 2025-06-12 DIAGNOSIS — L81.4 OTHER MELANIN HYPERPIGMENTATION: ICD-10-CM

## 2025-06-12 PROBLEM — D22.5 MELANOCYTIC NEVI OF TRUNK: Status: ACTIVE | Noted: 2025-06-12

## 2025-06-12 PROBLEM — D18.01 HEMANGIOMA OF SKIN AND SUBCUTANEOUS TISSUE: Status: ACTIVE | Noted: 2025-06-12

## 2025-06-12 PROCEDURE — 99213 OFFICE O/P EST LOW 20 MIN: CPT

## 2025-06-12 PROCEDURE — OTHER COUNSELING: OTHER

## 2025-06-12 PROCEDURE — OTHER MIPS QUALITY: OTHER

## 2025-06-12 ASSESSMENT — LOCATION SIMPLE DESCRIPTION DERM
LOCATION SIMPLE: RIGHT UPPER BACK
LOCATION SIMPLE: LEFT UPPER BACK

## 2025-06-12 ASSESSMENT — LOCATION DETAILED DESCRIPTION DERM
LOCATION DETAILED: RIGHT MID-UPPER BACK
LOCATION DETAILED: LEFT SUPERIOR UPPER BACK
LOCATION DETAILED: RIGHT INFERIOR UPPER BACK
LOCATION DETAILED: LEFT SUPERIOR MEDIAL UPPER BACK

## 2025-06-12 ASSESSMENT — LOCATION ZONE DERM: LOCATION ZONE: TRUNK

## 2025-07-28 ENCOUNTER — HOSPITAL ENCOUNTER (OUTPATIENT)
Dept: INTERVENTIONAL RADIOLOGY/VASCULAR | Facility: HOSPITAL | Age: 75
Discharge: HOME | End: 2025-07-28
Attending: INTERNAL MEDICINE | Admitting: INTERNAL MEDICINE

## 2025-07-28 VITALS
RESPIRATION RATE: 18 BRPM | SYSTOLIC BLOOD PRESSURE: 128 MMHG | HEIGHT: 62 IN | DIASTOLIC BLOOD PRESSURE: 70 MMHG | WEIGHT: 140 LBS | BODY MASS INDEX: 25.76 KG/M2 | TEMPERATURE: 98 F | HEART RATE: 86 BPM

## 2025-07-28 DIAGNOSIS — R55 SYNCOPE: ICD-10-CM

## 2025-07-28 PROCEDURE — 33285 INSJ SUBQ CAR RHYTHM MNTR: CPT | Performed by: NURSE PRACTITIONER

## 2025-07-28 RX ORDER — CHLORHEXIDINE GLUCONATE 40 MG/ML
SOLUTION TOPICAL
Status: DISCONTINUED | OUTPATIENT
Start: 2025-07-28 | End: 2025-07-28 | Stop reason: HOSPADM

## 2025-07-28 RX ORDER — SODIUM CHLORIDE 9 MG/ML
INJECTION, SOLUTION INTRAVENOUS
Status: DISCONTINUED | OUTPATIENT
Start: 2025-07-29 | End: 2025-07-28 | Stop reason: HOSPADM

## 2025-07-28 RX ORDER — LIDOCAINE HYDROCHLORIDE AND EPINEPHRINE BITARTRATE 20; .01 MG/ML; MG/ML
INJECTION, SOLUTION SUBCUTANEOUS
Status: COMPLETED
Start: 2025-07-28 | End: 2025-07-28

## 2025-07-28 RX ADMIN — CHLORHEXIDINE GLUCONATE: 40 SOLUTION TOPICAL at 13:30:00

## (undated) DEVICE — 3.0MM PRECISION ROUND

## (undated) DEVICE — NV I-PAS III DIAMOND TIP

## (undated) DEVICE — STERILE POLYISOPRENE POWDER-FREE SURGICAL GLOVES: Brand: PROTEXIS

## (undated) DEVICE — COVER,TABLE,44X90,STERILE: Brand: MEDLINE

## (undated) DEVICE — Device: Brand: INTELLICART™

## (undated) DEVICE — MAXCESS MAS TLIF 2 KIT ACCESS

## (undated) DEVICE — LIGHT HANDLE

## (undated) DEVICE — PEN SKIN MARKING REG TIP VIOLT

## (undated) DEVICE — LAMINECTOMY CDS: Brand: MEDLINE INDUSTRIES, INC.

## (undated) DEVICE — WRAP THERAPEUTIC BACK WO GEL P

## (undated) DEVICE — 450 ML BOTTLE OF 0.05% CHLORHEXIDINE GLUCONATE IN 99.95% STERILE WATER FOR IRRIGATION, USP AND APPLICATOR.: Brand: IRRISEPT ANTIMICROBIAL WOUND LAVAGE

## (undated) DEVICE — GRAFT DELIVERY SYS MODULE

## (undated) DEVICE — SLEEVE KENDALL SCD EXPRESS MED

## (undated) DEVICE — FLOSEAL WITH RECOTHROM - 5ML: Brand: FLOSEAL HEMOSTATIC MATRIX

## (undated) DEVICE — SOL NACL IRRIG 0.9% 1000ML BTL

## (undated) DEVICE — WIRE FX PRCPT KRSH BVL BLNT

## (undated) DEVICE — UNDYED BRAIDED (POLYGLACTIN 910), SYNTHETIC ABSORBABLE SUTURE: Brand: COATED VICRYL

## (undated) DEVICE — C-ARMOR C-ARM EQUIPMENT COVERS CLEAR STERILE UNIVERSAL FIT 12 PER CASE: Brand: C-ARMOR

## (undated) DEVICE — RELINE POWER

## (undated) DEVICE — SUT VICRYL 1 OS-6 J535H

## (undated) DEVICE — SUT VICRYL 2-0 FSL J589H

## (undated) DEVICE — KIT POSITIONING PROAXIS PRONEV

## (undated) DEVICE — 3M™ TEGADERM™ TRANSPARENT FILM DRESSING, 1626W, 4 IN X 4-3/4 IN (10 CM X 12 CM), 50 EACH/CARTON, 4 CARTON/CASE: Brand: 3M™ TEGADERM™

## (undated) DEVICE — C-ARM: Brand: UNBRANDED

## (undated) DEVICE — RELINE MAS BLADE FASCIAL SPLIT

## (undated) DEVICE — DRAPE SURG 18X24

## (undated) NOTE — LETTER
[address]  [address]  Authorization for Surgical Operation and Procedure     Date:___________                                                                                                         Time:__________  I hereby authorize * Surgery not found *, my physician and his/her assistants (if applicable), which may include medical students, residents, and/or fellows, to perform the following surgical operation/ procedure and administer such anesthesia as may be determined necessary by my physician:  Operation/Procedure name (s)  on Ciara Hunter   2.   I recognize that during the surgical operation/procedure, unforeseen conditions may necessitate additional or different procedures than those listed above.  I, therefore, further authorize and request that the above-named surgeon, assistants, or designees perform such procedures as are, in their judgment, necessary and desirable.    3.   My surgeon/physician has discussed prior to my surgery the potential benefits, risks and side effects of this procedure; the likelihood of achieving goals; and potential problems that might occur during recuperation.  They also discussed reasonable alternatives to the procedure, including risks, benefits, and side effects related to the alternatives and risks related to not receiving this procedure.  I have had all my questions answered and I acknowledge that no guarantee has been made as to the result that may be obtained.    4.   Should the need arise during my operation/procedure, which includes change of level of care prior to discharge, I also consent to the administration of blood and/or blood products.  Further, I understand that despite careful testing and screening of blood or blood products by collecting agencies, I may still be subject to ill effects as a result of receiving a blood transfusion and/or blood products.  The following are some, but not all, of the potential risks that  can occur: fever and allergic reactions, hemolytic reactions, transmission of diseases such as Hepatitis, AIDS and Cytomegalovirus (CMV) and fluid overload.  In the event that I wish to have an autologous transfusion of my own blood, or a directed donor transfusion, I will discuss this with my physician.  Check only if Refusing Blood or Blood Products  I understand refusal of blood or blood products as deemed necessary by my physician may have serious consequences to my condition to include possible death. I hereby assume responsibility for my refusal and release the hospital, its personnel, and my physicians from any responsibility for the consequences of my refusal.          o  Refuse      5.   I authorize the use of any specimen, organs, tissues, body parts or foreign objects that may be removed from my body during the operation/procedure for diagnosis, research or teaching purposes and their subsequent disposal by hospital authorities.  I also authorize the release of specimen test results and/or written reports to my treating physician on the hospital medical staff or other referring or consulting physicians involved in my care, at the discretion of the Pathologist or my treating physician.    6.   I consent to the photographing or videotaping of the operations or procedures to be performed, including appropriate portions of my body for medical, scientific, or educational purposes, provided my identity is not revealed by the pictures or by descriptive texts accompanying them.  If the procedure has been photographed/videotaped, the surgeon will obtain the original picture, image, videotape or CD.  The hospital will not be responsible for storage, release or maintenance of the picture, image, tape or CD.    7.   I consent to the presence of a  or observers in the operating room as deemed necessary by my physician or their designees.    8.   I recognize that in the event my procedure results in  extended X-Ray/fluoroscopy time, I may develop a skin reaction.    9. If I have a Do Not Attempt Resuscitation (DNAR) order in place, that status will be suspended while in the operating room, procedural suite, and during the recovery period unless otherwise explicitly stated by me (or a person authorized to consent on my behalf). The surgeon or my attending physician will determine when the applicable recovery period ends for purposes of reinstating the DNAR order.  10. Patients having a sterilization procedure: I understand that if the procedure is successful the results will be permanent and it will therefore be impossible for me to inseminate, conceive, or bear children.  I also understand that the procedure is intended to result in sterility, although the result has not been guaranteed.   11. I acknowledge that my physician has explained sedation/analgesia administration to me including the risk and benefits I consent to the administration of sedation/analgesia as may be necessary or desirable in the judgment of my physician.    I CERTIFY THAT I HAVE READ AND FULLY UNDERSTAND THE ABOVE CONSENT TO OPERATION and/or OTHER PROCEDURE.    _________________________________________  __________________________________  Signature of Patient     Signature of Responsible Person         ___________________________________         Printed Name of Responsible Person           _________________________________                 Relationship to Patient  _________________________________________  ______________________________  Signature of Witness          Date  Time      Patient Name: Ciara Hunter     : 1950                 Printed: 2024     Medical Record #: [id number]                     Page 1 [address]  [address]    Consent for Anesthesia    I, Ciara Hunter agree to be cared for by an anesthesiologist, who is specially trained to  monitor me and give me medicine to put me to sleep or keep me comfortable during my procedure    I understand that my anesthesiologist is not an employee or agent of [facility] or [employer]. He or she works for [employer].    As the patient asking for anesthesia services, I agree to:  Allow the anesthesiologist (anesthesia doctor) to give me medicine and do additional procedures as necessary. Some examples are: Starting or using an “IV” to give me medicine, fluids or blood during my procedure, and having a breathing tube placed to help me breathe when I’m asleep (intubation). In the event that my heart stops working properly, I understand that my anesthesiologist will make every effort to sustain my life, unless otherwise directed by [facility] Do Not Resuscitate documents.  Tell my anesthesia doctor before my procedure:  If I am pregnant.  The last time that I ate or drank.  All of the medicines I take (including prescriptions, herbal supplements, and pills I can buy without a prescription (including street drugs/illegal medications). Failure to inform my anesthesiologist about these medicines may increase my risk of anesthetic complications.  If I am allergic to anything or have had a reaction to anesthesia before.  I understand how the anesthesia medicine will help me (benefits).  I understand that with any type of anesthesia medicine there are risks:  The most common risks are: nausea, vomiting, sore throat, muscle soreness, damage to my eyes, mouth, or teeth (from breathing tube placement).  Rare risks include: remembering what happened during my procedure, allergic reactions to medications, injury to my airway, heart, lungs, vision, nerves, or muscles and in extremely rare instances death.  My doctor has explained to me other choices available to me for my care (alternatives).  Pregnant Patients (“epidural”):  I understand that the risks of having an epidural (medicine  given into my back to help control pain during labor), include itching, low blood pressure, difficulty urinating, headache or slowing of the baby’s heart. Very rare risks include infection, bleeding, seizure, irregular heart rhythms and nerve injury.  Regional Anesthesia (“spinal”, “epidural”, & “nerve blocks”):  I understand that rare but potential complications include headache, bleeding, infection, seizure, irregular heart rhythms, and nerve injury.    I can change my mind about having anesthesia services at any time before I get the medicine.    _____________________________________________________________________________  Patient (or Representative) Signature/Relationship to Patient  Date   Time    _____________________________________________________________________________   Name (if used)    Language/Organization   Time    _____________________________________________________________________________  Anesthesiologist Signature     Date   Time  I have discussed the procedure and information above with the patient (or patient’s representative) and answered their questions. The patient or their representative has agreed to have anesthesia services.    _____________________________________________________________________________  Witness        Date   Time  I have verified that the signature is that of the patient or patient’s representative, and that it was signed before the procedure  Patient Name: Ciara Hunter     : 1950                 Printed: 2024     Medical Record #: [id number]                     Page 2 of 2

## (undated) NOTE — LETTER
OUTSIDE TESTING RESULT REQUEST     IMPORTANT: FOR YOUR IMMEDIATE ATTENTION  Please FAX all test results listed below to: [phone]        * * * * If testing is NOT complete, arrange with patient A.S.A.P. * * * *      Patient Name: Kell Forrest  Surgery Date: 2023  Medical Record: [id number]  CSN: [id number]  : 1950 - A: 74 y     Sex: female  Surgeon(s):  Isabella Terry MD  Procedure: LUMBAR 3 - LUMBAR 4, LUMBAR 4 - LUMBAR 5 TRANSFORAMINAL LUMBAR INTERBODY FUSION WITH HARDWARE  Anesthesia Type: General     Surgeon: Isabella Terry MD     The following Testing and Time Line are REQUIRED PER ANESTHESIA     EKG READ AND SIGNED WITHIN   90 days  Chest X-Ray within 6 months  CBC [with Differential & Platelets] within  90 days  CMP (requires 4 hour fast) within  90 days  PT/INR within  30 days  PTT within  30 days  UA with Reflex to Culture within  14 days  MSSA/MRSA Nasal screening within 30 days      Thank You,   Sent by:Davida, [address]

## (undated) NOTE — LETTER
OUTSIDE TESTING RESULT REQUEST     IMPORTANT: FOR YOUR IMMEDIATE ATTENTION  Please FAX all test results listed below to: [phone]       * * * * If testing is NOT complete, arrange with patient A.S.A.P. * * * *      Patient Name: Neil Whitaker  Surgery Date: 2023  Medical Record: [id number]  CSN: [id number]  : 1950 - A: 74 y     Sex: female  Surgeon(s):  Alok Glasgow MD  Procedure: LUMBAR 3 - LUMBAR 4, LUMBAR 4 - LUMBAR 5 TRANSFORAMINAL LUMBAR INTERBODY FUSION WITH HARDWARE  Anesthesia Type: General     Surgeon: Alok Glasgow MD     The following Testing and Time Line are REQUIRED PER ANESTHESIA     EKG READ AND SIGNED WITHIN   90 days      Thank You,   Sent by:TAE Higuera -  [facility] Pre-Admission Testing